# Patient Record
Sex: FEMALE | Race: WHITE | NOT HISPANIC OR LATINO
[De-identification: names, ages, dates, MRNs, and addresses within clinical notes are randomized per-mention and may not be internally consistent; named-entity substitution may affect disease eponyms.]

---

## 2019-01-10 ENCOUNTER — APPOINTMENT (OUTPATIENT)
Dept: ORTHOPEDIC SURGERY | Facility: CLINIC | Age: 59
End: 2019-01-10

## 2022-05-03 ENCOUNTER — NON-APPOINTMENT (OUTPATIENT)
Age: 62
End: 2022-05-03

## 2022-05-03 ENCOUNTER — APPOINTMENT (OUTPATIENT)
Dept: INTERNAL MEDICINE | Facility: CLINIC | Age: 62
End: 2022-05-03
Payer: MEDICARE

## 2022-05-03 ENCOUNTER — LABORATORY RESULT (OUTPATIENT)
Age: 62
End: 2022-05-03

## 2022-05-03 ENCOUNTER — TRANSCRIPTION ENCOUNTER (OUTPATIENT)
Age: 62
End: 2022-05-03

## 2022-05-03 VITALS
HEIGHT: 65 IN | BODY MASS INDEX: 22.99 KG/M2 | OXYGEN SATURATION: 97 % | DIASTOLIC BLOOD PRESSURE: 80 MMHG | TEMPERATURE: 98.4 F | WEIGHT: 138 LBS | SYSTOLIC BLOOD PRESSURE: 125 MMHG | HEART RATE: 81 BPM

## 2022-05-03 DIAGNOSIS — F02.80 ALZHEIMER'S DISEASE, UNSPECIFIED: ICD-10-CM

## 2022-05-03 DIAGNOSIS — F41.8 OTHER SPECIFIED ANXIETY DISORDERS: ICD-10-CM

## 2022-05-03 DIAGNOSIS — G30.9 ALZHEIMER'S DISEASE, UNSPECIFIED: ICD-10-CM

## 2022-05-03 DIAGNOSIS — Z00.00 ENCOUNTER FOR GENERAL ADULT MEDICAL EXAMINATION W/OUT ABNORMAL FINDINGS: ICD-10-CM

## 2022-05-03 PROCEDURE — 99386 PREV VISIT NEW AGE 40-64: CPT | Mod: 25

## 2022-05-03 PROCEDURE — 93000 ELECTROCARDIOGRAM COMPLETE: CPT | Mod: 59

## 2022-05-03 PROCEDURE — 36415 COLL VENOUS BLD VENIPUNCTURE: CPT

## 2022-05-03 PROCEDURE — G0444 DEPRESSION SCREEN ANNUAL: CPT | Mod: NC,59

## 2022-05-03 RX ORDER — CHLORHEXIDINE GLUCONATE 4 %
1000 LIQUID (ML) TOPICAL
Refills: 0 | Status: ACTIVE | COMMUNITY

## 2022-05-03 RX ORDER — DONEPEZIL HYDROCHLORIDE 10 MG/1
10 TABLET, FILM COATED ORAL
Refills: 0 | Status: ACTIVE | COMMUNITY

## 2022-05-03 RX ORDER — BUPROPION HYDROCHLORIDE 300 MG/1
300 TABLET, EXTENDED RELEASE ORAL
Refills: 0 | Status: ACTIVE | COMMUNITY

## 2022-05-03 RX ORDER — MEMANTINE HYDROCHLORIDE 10 MG/1
10 TABLET, FILM COATED ORAL
Refills: 0 | Status: ACTIVE | COMMUNITY

## 2022-05-03 NOTE — HEALTH RISK ASSESSMENT
[0] : 2) Feeling down, depressed, or hopeless: Not at all (0) [PHQ-2 Negative - No further assessment needed] : PHQ-2 Negative - No further assessment needed [Patient reported mammogram was normal] : Patient reported mammogram was normal [Patient reported PAP Smear was normal] : Patient reported PAP Smear was normal [# Of Children ___] : has [unfilled] children [[id number]] : 0 [Reports changes in hearing] : Reports no changes in hearing [Reports changes in vision] : Reports no changes in vision [MammogramDate] : 01/19 [PapSmearDate] : 11/15

## 2022-05-03 NOTE — PLAN
[FreeTextEntry1] : Reviewed age appropriate preventive screening, discussed importance of healthy eating and routine physical activity.\par -Bouts of anxiety, skin picking-increase buspar to 30mg BID\par

## 2022-05-03 NOTE — PHYSICAL EXAM
[Normal] : normal rate, regular rhythm, normal S1 and S2 and no murmur heard [No Edema] : there was no peripheral edema [Soft] : abdomen soft [Non Tender] : non-tender [Non-distended] : non-distended [de-identified] : Areas of scalps along fingers/forehead

## 2022-05-03 NOTE — HISTORY OF PRESENT ILLNESS
[FreeTextEntry1] : NP CPE [de-identified] : 62 y/o f. hx of Alzheimer's dementia, depression w/ anxiety here for CPE\par Last mammogram two years ago-normal\par No prior colonoscopy\par No regular exercise\par Pending appt w/ neurology

## 2022-05-06 LAB
25(OH)D3 SERPL-MCNC: 40.5 NG/ML
BASOPHILS # BLD AUTO: 0.03 K/UL
BASOPHILS NFR BLD AUTO: 0.6 %
EOSINOPHIL # BLD AUTO: 0.09 K/UL
EOSINOPHIL NFR BLD AUTO: 1.8 %
ESTIMATED AVERAGE GLUCOSE: 114 MG/DL
HBA1C MFR BLD HPLC: 5.6 %
HCT VFR BLD CALC: 44.4 %
HGB BLD-MCNC: 14.3 G/DL
IMM GRANULOCYTES NFR BLD AUTO: 0.2 %
LYMPHOCYTES # BLD AUTO: 1.54 K/UL
LYMPHOCYTES NFR BLD AUTO: 30.1 %
MAN DIFF?: NORMAL
MCHC RBC-ENTMCNC: 31.9 PG
MCHC RBC-ENTMCNC: 32.2 GM/DL
MCV RBC AUTO: 99.1 FL
MONOCYTES # BLD AUTO: 0.38 K/UL
MONOCYTES NFR BLD AUTO: 7.4 %
NEUTROPHILS # BLD AUTO: 3.06 K/UL
NEUTROPHILS NFR BLD AUTO: 59.9 %
PLATELET # BLD AUTO: 365 K/UL
RBC # BLD: 4.48 M/UL
RBC # FLD: 13.6 %
WBC # FLD AUTO: 5.11 K/UL

## 2022-05-09 ENCOUNTER — NON-APPOINTMENT (OUTPATIENT)
Age: 62
End: 2022-05-09

## 2022-05-09 LAB
ALBUMIN SERPL ELPH-MCNC: 4.2 G/DL
ALP BLD-CCNC: 128 U/L
ALT SERPL-CCNC: 22 U/L
ANION GAP SERPL CALC-SCNC: 12 MMOL/L
APPEARANCE: CLEAR
AST SERPL-CCNC: 18 U/L
BILIRUB SERPL-MCNC: <0.2 MG/DL
BILIRUBIN URINE: NEGATIVE
BLOOD URINE: NEGATIVE
BUN SERPL-MCNC: 15 MG/DL
CALCIUM SERPL-MCNC: 9 MG/DL
CHLORIDE SERPL-SCNC: 106 MMOL/L
CHOLEST SERPL-MCNC: 257 MG/DL
CO2 SERPL-SCNC: 26 MMOL/L
COLOR: NORMAL
CREAT SERPL-MCNC: 1.23 MG/DL
EGFR: 50 ML/MIN/1.73M2
GLUCOSE QUALITATIVE U: NEGATIVE
GLUCOSE SERPL-MCNC: 98 MG/DL
HDLC SERPL-MCNC: 55 MG/DL
KETONES URINE: NEGATIVE
LDLC SERPL CALC-MCNC: NORMAL MG/DL
LEUKOCYTE ESTERASE URINE: NEGATIVE
NITRITE URINE: NEGATIVE
NONHDLC SERPL-MCNC: 202 MG/DL
PH URINE: 6
POTASSIUM SERPL-SCNC: 4 MMOL/L
PROT SERPL-MCNC: 6.5 G/DL
PROTEIN URINE: NORMAL
SODIUM SERPL-SCNC: 143 MMOL/L
SPECIFIC GRAVITY URINE: 1.02
TRIGL SERPL-MCNC: 466 MG/DL
TSH SERPL-ACNC: 4.26 UIU/ML
UROBILINOGEN URINE: NORMAL
VIT B12 SERPL-MCNC: 1417 PG/ML

## 2022-07-20 ENCOUNTER — APPOINTMENT (OUTPATIENT)
Dept: NEUROLOGY | Facility: CLINIC | Age: 62
End: 2022-07-20

## 2022-07-28 ENCOUNTER — RX RENEWAL (OUTPATIENT)
Age: 62
End: 2022-07-28

## 2022-08-08 ENCOUNTER — TRANSCRIPTION ENCOUNTER (OUTPATIENT)
Age: 62
End: 2022-08-08

## 2022-11-21 ENCOUNTER — RX RENEWAL (OUTPATIENT)
Age: 62
End: 2022-11-21

## 2022-11-21 RX ORDER — BUSPIRONE HYDROCHLORIDE 30 MG/1
30 TABLET ORAL
Qty: 60 | Refills: 0 | Status: ACTIVE | COMMUNITY
Start: 2022-07-28 | End: 1900-01-01

## 2023-01-23 ENCOUNTER — RX RENEWAL (OUTPATIENT)
Age: 63
End: 2023-01-23

## 2023-03-11 ENCOUNTER — EMERGENCY (EMERGENCY)
Facility: HOSPITAL | Age: 63
LOS: 1 days | Discharge: HOME | End: 2023-03-11
Attending: EMERGENCY MEDICINE | Admitting: EMERGENCY MEDICINE
Payer: MEDICARE

## 2023-03-11 VITALS
SYSTOLIC BLOOD PRESSURE: 131 MMHG | OXYGEN SATURATION: 99 % | RESPIRATION RATE: 16 BRPM | DIASTOLIC BLOOD PRESSURE: 81 MMHG | HEART RATE: 84 BPM | TEMPERATURE: 98 F

## 2023-03-11 VITALS
TEMPERATURE: 98 F | HEIGHT: 64 IN | SYSTOLIC BLOOD PRESSURE: 134 MMHG | OXYGEN SATURATION: 98 % | RESPIRATION RATE: 16 BRPM | HEART RATE: 83 BPM | WEIGHT: 136.69 LBS | DIASTOLIC BLOOD PRESSURE: 82 MMHG

## 2023-03-11 PROCEDURE — 99284 EMERGENCY DEPT VISIT MOD MDM: CPT

## 2023-03-11 PROCEDURE — 70450 CT HEAD/BRAIN W/O DYE: CPT | Mod: 26,MA

## 2023-03-11 PROCEDURE — 70450 CT HEAD/BRAIN W/O DYE: CPT | Mod: MA

## 2023-03-11 PROCEDURE — 99284 EMERGENCY DEPT VISIT MOD MDM: CPT | Mod: 25

## 2023-03-11 NOTE — ED PROVIDER NOTE - NSFOLLOWUPINSTRUCTIONS_ED_ALL_ED_FT
Head Injury, Adult    There are many types of head injuries. Head injuries can be as minor as a small bump, or they can be a serious medical issue. More severe head injuries include:  •A jarring injury to the brain (concussion).    •A bruise (contusion) of the brain. This means there is bleeding in the brain that can cause swelling.    •A cracked skull (skull fracture).    •Bleeding in the brain that collects, clots, and forms a bump (hematoma).    After a head injury, most problems occur within the first 24 hours, but side effects may occur up to 7–10 days after the injury. It is important to watch your condition for any changes. You may need to be observed in the emergency department or urgent care, or you may be admitted to the hospital.    What are the causes?    There are many possible causes of a head injury. Serious head injuries may be caused by car accidents, bicycle or motorcycle accidents, sports injuries, falls, or being struck by an object.    What are the symptoms?    Symptoms of a head injury include a contusion, bump, or bleeding at the site of the injury. Other physical symptoms may include:  •Headache.    •Nausea or vomiting.    •Dizziness.    •Blurred or double vision.    •Being uncomfortable around bright lights or loud noises.    •Seizures.    •Feeling tired.    •Trouble being awakened.    •Loss of consciousness.    Mental or emotional symptoms may include:  •Irritability.    •Confusion and memory problems.    •Poor attention and concentration.    •Changes in eating or sleeping habits.    •Anxiety or depression.    How is this diagnosed?    This condition can usually be diagnosed based on your symptoms, a description of the injury, and a physical exam. You may also have imaging tests done, such as a CT scan or an MRI.    How is this treated?    Treatment for this condition depends on the severity and type of injury you have. The main goal of treatment is to prevent complications and allow the brain time to heal.    Mild head injury     If you have a mild head injury, you may be sent home, and treatment may include:  •Observation. A responsible adult should stay with you for 24 hours after your injury and check on you often.    •Physical rest.    •Brain rest.    •Pain medicines.    Severe head injury    If you have a severe head injury, treatment may include:•Close observation. This includes hospitalization with the following care:  •Frequent physical exams.    •Frequent checks of how your brain and nervous system are working (neurological status).    •Checking your blood pressure and oxygen levels.    •Medicines to relieve pain, prevent seizures, and decrease brain swelling.    •Airway protection and breathing support. This may include using a ventilator.    •Treatments that monitor and manage swelling inside the brain.    •Brain surgery. This may be needed to:  •Remove a collection of blood or blood clots.    •Stop the bleeding.    •Remove a part of the skull to allow room for the brain to swell.    Follow these instructions at home:    Activity     •Rest and avoid activities that are physically hard or tiring.    •Make sure you get enough sleep.    •Let your brain rest by limiting activities that require a lot of thought or attention, such as:  •Watching TV.    •Playing memory games and puzzles.    •Job-related work or homework.    •Working on the computer, using social media, and texting.    •Avoid activities that could cause another head injury, such as playing sports, until your health care provider approves. Having another head injury, especially before the first one has healed, can be dangerous.    •Ask your health care provider when it is safe for you to return to your regular activities, including work or school. Ask your health care provider for a step-by-step plan for gradually returning to activities.    •Ask your health care provider when you can drive, ride a bicycle, or use heavy machinery. Your ability to react may be slower after a brain injury. Do not do these activities if you are dizzy.    Lifestyle      • Do not drink alcohol until your health care provider approves. Do not use drugs. Alcohol and certain drugs may slow your recovery and can put you at risk of further injury.    •If it is harder than usual to remember things, write them down.    •If you are easily distracted, try to do one thing at a time.    •Talk with family members or close friends when making important decisions.    •Tell your friends, family, a trusted colleague, and  about your injury, symptoms, and restrictions. Have them watch for any new or worsening problems.    General instructions     •Take over-the-counter and prescription medicines only as told by your health care provider.    •Have someone stay with you for 24 hours after your head injury. This person should watch you for any changes in your symptoms and be ready to seek medical help.    •Keep all follow-up visits as told by your health care provider. This is important.    How is this prevented?    •Work on improving your balance and strength to avoid falls.    •Wear a seat belt when you are in a moving vehicle.    •Wear a helmet when riding a bicycle, skiing, or doing any other sport or activity that has a risk of injury.    •If you drink alcohol:•Limit how much you use to:  •0–1 drink a day for nonpregnant women.    •0–2 drinks a day for men.    •Be aware of how much alcohol is in your drink. In the [place] one drink equals one 12 oz bottle of beer (355 mL), one 5 oz glass of wine (148 mL), or one 1½ oz glass of hard liquor (44 mL).    •Take safety measures in your home, such as:  •Removing clutter and tripping hazards from floors and stairways.    •Using grab bars in bathrooms and handrails by stairs.    •Placing non-slip mats on floors and in bathtubs.    •Improving lighting in dim areas.    Where to find more information    •Centers for Disease Control and Prevention: www.cdc.gov    Get help right away if:  •You have:  •A severe headache that is not helped by medicine.    •Trouble walking or weakness in your arms and legs.    •Clear or bloody fluid coming from your nose or ears.    •Changes in your vision.    •A seizure.    •Increased confusion or irritability.    •Your symptoms get worse.    •You are sleepier than normal and have trouble staying awake.    •You lose your balance.    •Your pupils change size.    •Your speech is slurred.    •Your dizziness gets worse.    •You vomit.    These symptoms may represent a serious problem that is an emergency. Do not wait to see if the symptoms will go away. Get medical help right away. Call your local emergency services (911 in the [place] Do not drive yourself to the hospital.     Summary    •Head injuries can be minor, or they can be a serious medical issue requiring immediate attention.    •Treatment for this condition depends on the severity and type of injury you have.    •Have someone stay with you for 24 hours after your injury and check on you often.    •Ask your health care provider when it is safe for you to return to your regular activities, including work or school.    •Head injury prevention includes wearing a seat belt in a motor vehicle, using a helmet on a bicycle, limiting alcohol use, and taking safety measures in your home.    This information is not intended to replace advice given to you by your health care provider. Make sure you discuss any questions you have with your health care provider.

## 2023-03-11 NOTE — ED PROVIDER NOTE - CARE PROVIDER_API CALL
NICOLE MATTHEWS  Cardiology  [address] [facility] [address]  [place] [address]  Phone: [phone]  Fax: [phone]  Follow Up Time: 1-3 Days   NICOLE MATTHEWS  Cardiology  [address] [facility] [address]  [place] [address]  Phone: [phone]  Fax: [phone]  Follow Up Time: 1-3 Days   NICOLE MATTHEWS  Cardiology  [address] [facility] [address]  [place] [address]  Phone: [phone]  Fax: [phone]  Follow Up Time: 1-3 Days

## 2023-03-11 NOTE — ED PROVIDER NOTE - OBJECTIVE STATEMENT
Patient brought in by family member for CAT scan of head status post head injury.  Patient poor historian secondary to dementia unsure how she hit her head but denies any headache dizziness nausea vomiting weakness numbness or any other complaints.  Per family, they were out to lunch when patient accidentally walked into a clear glass door bumping her head.  Injury occurred approximately 1:00.  Patient has been acting as her usual self since, and has not had any complaints however when they returned to the patient's assisted living they did not want to take the patient back until the had a CAT scan of the head.  MARGARETTE Steele

## 2023-03-11 NOTE — ED ADULT TRIAGE NOTE - CHIEF COMPLAINT QUOTE
" We were out for lunch - she accidently banged her forehead on to a very clean glass door " No LOC No pain No blood thinners

## 2023-03-11 NOTE — ED ADULT TRIAGE NOTE - GLASGOW COMA SCALE: BEST MOTOR RESPONSE, MLM
(M6) obeys commands [FreeTextEntry1] : 51 year old female  with a past medical history of hypothyroidism who presents for management of her thyroid disease\par \par 1. Weight gain\par Suspect patient is experiencing all her symptoms from menopause\par Her labs are consistent with menopause\par Patient stopped Phentermine\par She has been able to lose 8 lbs \par She is asking for another agent to helo with weight loss\par I suggested Bashir \par I also encouraged that overall her weight is healthy and she follows a very healthy lifestyle\par \par 2. Hypothyroidism\par TFTs are appropriate range\par Cont Levothyroxine to 100 mcg 6.5 tabs per week\par \par Follow up in 6 months

## 2023-03-11 NOTE — ED ADULT NURSE NOTE - NSIMPLEMENTINTERV_GEN_ALL_ED
Implemented All Universal Safety Interventions:  [place] to call system. Call bell, personal items and telephone within reach. Instruct patient to call for assistance. Room bathroom lighting operational. Non-slip footwear when patient is off stretcher. Physically safe environment: no spills, clutter or unnecessary equipment. Stretcher in lowest position, wheels locked, appropriate side rails in place. Implemented All Universal Safety Interventions:  [place] to call system. Call bell, personal items and telephone within reach. Instruct patient to call for assistance. Room bathroom lighting operational. Non-slip footwear when patient is off stretcher. Physically safe environment: no spills, clutter or unnecessary equipment. Stretcher in lowest position, wheels locked, appropriate side rails in place. Implemented All Universal Safety Interventions:  [place] to call system. Call bell, personal items and telephone within reach. Instruct patient to call for assistance. Room bathroom lighting operational. Non-slip footwear when patient is off stretcher. Physically safe environment: no spills, clutter or unnecessary equipment. Stretcher in lowest position, wheels locked, appropriate side rails in place.

## 2023-03-11 NOTE — ED PROVIDER NOTE - PATIENT PORTAL LINK FT
You can access the FollowMyHealth Patient Portal offered by [facility] by registering at the following website: [email or web address]. By joining [employer]’s FollowMyHealth portal, you will also be able to view your health information using other applications (apps) compatible with our system. You can access the FollowMyHealth Patient Portal offered by [facility] by registering at the following website: [email or web address]. By joining [employer] [facility]’s FollowMyHealth portal, you will also be able to view your health information using other applications (apps) compatible with our system. You can access the FollowMyHealth Patient Portal offered by [facility] by registering at the following website: [email or web address]. By joining [employer]’s FollowMyHealth portal, you will also be able to view your health information using other applications (apps) compatible with our system.

## 2023-03-11 NOTE — ED ADULT NURSE NOTE - OBJECTIVE STATEMENT
pt brought in by family s/p walking into glass door hitting head, pt went back to AL facility and they requested pt come to the ED to be checked out, pt is poor historian secondary to dementia. as per family member is at her baseline mental status. pt denies n/v, any headache dizziness, blurry vision, weakness numbness or any other complaint. neuro intact, no acute focal deficits.

## 2023-03-11 NOTE — ED PROVIDER NOTE - CLINICAL SUMMARY MEDICAL DECISION MAKING FREE TEXT BOX
Patient brought in by family member for CAT scan of head status post head injury.  Patient poor historian secondary to dementia unsure how she hit her head but denies any headache dizziness nausea vomiting weakness numbness or any other complaints.  Per family they were out to lunch when patient accidentally walked into a "clear glass door bumping her head.  Injury occurred approximately 1:00 patient has been acting as her usual self since has not had any complaints however when they returned to the patient's assisted living they did not want to take the patient back until the had a CAT scan of the head.  PMD Rattiner    Plan CT head  Differential including but not limited to intracranial hemorrhage skull fracture

## 2023-03-11 NOTE — ED PROVIDER NOTE - DIFFERENTIAL DIAGNOSIS
Differential including but not limited to intracranial hemorrhage skull fracture Differential Diagnosis

## 2023-03-11 NOTE — ED PROVIDER NOTE - PROGRESS NOTE DETAILS
Reevaluated patient at bedside with family.  Patient feeling well.  Discussed the results of CT and copy of reports given.   An opportunity to ask questions was given.  Discussed the importance of prompt, close medical follow-up.  Patient will return with any changes, concerns or persistent / worsening symptoms.  Understanding of all instructions verbalized.

## 2023-08-18 ENCOUNTER — EMERGENCY (EMERGENCY)
Facility: HOSPITAL | Age: 63
LOS: 1 days | Discharge: HOME | End: 2023-08-18
Attending: STUDENT IN AN ORGANIZED HEALTH CARE EDUCATION/TRAINING PROGRAM | Admitting: STUDENT IN AN ORGANIZED HEALTH CARE EDUCATION/TRAINING PROGRAM
Payer: MEDICARE

## 2023-08-18 VITALS
DIASTOLIC BLOOD PRESSURE: 82 MMHG | OXYGEN SATURATION: 98 % | HEIGHT: 64 IN | SYSTOLIC BLOOD PRESSURE: 132 MMHG | RESPIRATION RATE: 18 BRPM | TEMPERATURE: 98 F | HEART RATE: 88 BPM | WEIGHT: 130.07 LBS

## 2023-08-18 LAB
APPEARANCE UR: CLEAR — SIGNIFICANT CHANGE UP
BACTERIA # UR AUTO: NEGATIVE /HPF — SIGNIFICANT CHANGE UP
BILIRUB UR-MCNC: NEGATIVE — SIGNIFICANT CHANGE UP
COLOR SPEC: YELLOW — SIGNIFICANT CHANGE UP
DIFF PNL FLD: NEGATIVE — SIGNIFICANT CHANGE UP
EPI CELLS # UR: PRESENT
GLUCOSE UR QL: NEGATIVE MG/DL — SIGNIFICANT CHANGE UP
KETONES UR-MCNC: NEGATIVE MG/DL — SIGNIFICANT CHANGE UP
LEUKOCYTE ESTERASE UR-ACNC: ABNORMAL
NITRITE UR-MCNC: NEGATIVE — SIGNIFICANT CHANGE UP
PH UR: 5.5 — SIGNIFICANT CHANGE UP (ref 5–8)
PROT UR-MCNC: NEGATIVE MG/DL — SIGNIFICANT CHANGE UP
RBC CASTS # UR COMP ASSIST: SIGNIFICANT CHANGE UP /HPF
SP GR SPEC: 1.03 — SIGNIFICANT CHANGE UP (ref 1–1.03)
UROBILINOGEN FLD QL: 0.2 MG/DL — SIGNIFICANT CHANGE UP (ref 0.2–1)
WBC UR QL: ABNORMAL /HPF (ref 0–5)

## 2023-08-18 PROCEDURE — 99284 EMERGENCY DEPT VISIT MOD MDM: CPT

## 2023-08-18 PROCEDURE — 81001 URINALYSIS AUTO W/SCOPE: CPT

## 2023-08-18 PROCEDURE — 99283 EMERGENCY DEPT VISIT LOW MDM: CPT

## 2023-08-18 PROCEDURE — 87086 URINE CULTURE/COLONY COUNT: CPT

## 2023-08-18 RX ORDER — HYDROCORTISONE 1 %
1 OINTMENT (GRAM) TOPICAL
Qty: 1 | Refills: 0
Start: 2023-08-18 | End: 2023-08-24

## 2023-08-18 RX ORDER — CEFUROXIME AXETIL 250 MG
1 TABLET ORAL
Qty: 14 | Refills: 0
Start: 2023-08-18 | End: 2023-08-24

## 2023-08-18 RX ORDER — CEFUROXIME AXETIL 250 MG
500 TABLET ORAL ONCE
Refills: 0 | Status: COMPLETED | OUTPATIENT
Start: 2023-08-18 | End: 2023-08-18

## 2023-08-18 RX ADMIN — Medication 500 MILLIGRAM(S): at 22:25

## 2023-08-18 NOTE — ED ADULT NURSE NOTE - CHIEF COMPLAINT QUOTE
per ems from the [facility], pt started wandering today. per ems assisted living is requesting a psych eval and evaluation for uti. hx of dementia. pt denies SI/HI per ems from the [facility], pt started wandering today. per ems assisted living is requesting a psych eval and evaluation for uti. hx of dementia. pt denies SI/HI per ems from the [facility], pt started wandering today. per ems assisted living is requesting a psych eval and evaluation for uti. hx of dementia. pt denies SI/HI

## 2023-08-18 NOTE — ED ADULT NURSE NOTE - CADM POA URETHRAL CATHETER
Attempted to reach patient for: routine supportive call to patient    Outcome: no answer, left message for call back    Next Follow Up: Tuesday  
no dysuria, no frequency, and no hematuria.
No

## 2023-08-18 NOTE — ED ADULT NURSE NOTE - NSFALLUNIVINTERV_ED_ALL_ED
Bed/Stretcher in lowest position, wheels locked, appropriate side rails in place/Call bell, personal items and telephone in reach/Instruct patient to call for assistance before getting out of bed/chair/stretcher/Non-slip footwear applied when patient is off stretcher/[place] to call system/Physically safe environment - no spills, clutter or unnecessary equipment/Purposeful proactive rounding/Room/bathroom lighting operational, light cord in reach Bed/Stretcher in lowest position, wheels locked, appropriate side rails in place/Call bell, personal items and telephone in reach/Instruct patient to call for assistance before getting out of bed/chair/stretcher/Non-slip footwear applied when patient is off stretcher/[place] to call system/Physically safe environment - no spills, clutter or unnecessary equipment/Purposeful proactive rounding/Room/bathroom lighting operational, light cord in reach Bed/Stretcher in lowest position, wheels locked, appropriate side rails in place/Call bell, personal items and telephone in reach/Instruct patient to call for assistance before getting out of bed/chair/stretcher/Non-slip footwear applied when patient is off stretcher/[place] to call system/Physically safe environment - no spills, clutter or unnecessary equipment/Purposeful proactive rounding/Room/bathroom lighting operational, light cord in reach

## 2023-08-18 NOTE — ED ADULT NURSE NOTE - OBJECTIVE STATEMENT
patient is from the [facility], patient was found wandering today. per ems assisted living is requesting a psych eval and evaluation for uti. hx of dementia. pt denies SI/HI patient is from the [facility], patient was found wandering today. per ems assisted living is requesting a psych eval and evaluation for uti. hx of dementia. pt denies SI/HI patient is from the [facility], patient was found wandering today. per ems assisted living is requesting a psych eval and evaluation for uti. hx of dementia. pt denies SI/HI

## 2023-08-18 NOTE — ED PROVIDER NOTE - OBJECTIVE STATEMENT
"Last Written Prescription Date:  2/21/2018  Last Fill Quantity: 30,  # refills: 1   Last office visit: 12/11/2017 with prescribing provider:  Dr. Blanchard   Future Office Visit:   Next 5 appointments (look out 90 days)     May 03, 2018  8:20 AM CDT   [facility]    [address] [place] [id number] [phone]                 Requested Prescriptions   Pending Prescriptions Disp Refills     atorvastatin (LIPITOR) 40 MG tablet [Pharmacy Med Name: ATORVASTATIN 40MG TABLET] 30 tablet      Sig: TAKE 1 TABLET BY MOUTH DAILY AT BEDTIME    Statins Protocol Passed    4/17/2018  9:58 AM       Passed - LDL on file in past 12 months    Recent Labs   Lab Test  06/15/17   0852   LDL  64            Passed - No abnormal creatine kinase in past 12 months    Recent Labs   Lab Test  11/05/15   1750   CKT  334*               Passed - Recent (12 mo) or future (30 days) visit within the authorizing provider's specialty    Patient had office visit in the last 12 months or has a visit in the next 30 days with authorizing provider or within the authorizing provider's specialty.  See \"Patient Info\" tab in inbasket, or \"Choose Columns\" in Meds & Orders section of the refill encounter.           Passed - Patient is age 18 or older          " Patient is a 62-year-old female brought in by EMS from the [facility] with past medical history of dementia anxiety depression insomnia overactive bladder coming in for evaluation for UTI.  Patient was found wandering in symptoms to rule out infection.  Patient does admit to having increased urgency and frequency no dysuria no nausea no vomiting no fever no chills no flank pain.  Patient also complaining of some hemorrhoids which she wants evaluated.  Confirmed with facility patient is not here for psychiatric evaluation.  Denies any suicidal homicidal ideation.  Patient's sister bedside. Patient is a 62-year-old female brought in by EMS from the [facility] with past medical history of dementia anxiety depression insomnia overactive bladder coming in for evaluation for UTI.  Patient was found wandering in symptoms to rule out infection.  Patient does admit to having increased urgency and frequency no dysuria no nausea no vomiting no fever no chills no flank pain.  Patient also complaining of some hemorrhoids which she wants evaluated.  Confirmed with facility patient is not here for psychiatric evaluation.  Denies any suicidal homicidal ideation.  Patient's sister bedside. Patient is a 62-year-old female brought in by EMS from the [facility] with past medical history of dementia anxiety depression insomnia overactive bladder coming in for evaluation for UTI.  Patient was found wandering in symptoms to rule out infection.  Patient does admit to having increased urgency and frequency no dysuria no nausea no vomiting no fever no chills no flank pain.  Patient also complaining of some hemorrhoids which she wants evaluated.  Confirmed with facility patient is not here for psychiatric evaluation.  Denies any suicidal homicidal ideation.  Patient's sister bedside.

## 2023-08-18 NOTE — ED PROVIDER NOTE - NSFOLLOWUPINSTRUCTIONS_ED_ALL_ED_FT
Take antibiotics as directed   apply anusol 2-4 times daily or after bm  family will  AZO over the counter  return to er for any worsening symptoms     Urinary Tract Infection, Adult    A urinary tract infection (UTI) is an infection of any part of the urinary tract. The urinary tract includes the kidneys, ureters, bladder, and urethra. These organs make, store, and get rid of urine in the body.    An upper UTI affects the ureters and kidneys. A lower UTI affects the bladder and urethra.    What are the causes?  Most urinary tract infections are caused by bacteria in your genital area around your urethra, where urine leaves your body. These bacteria grow and cause inflammation of your urinary tract.    What increases the risk?  You are more likely to develop this condition if:  You have a urinary catheter that stays in place.  You are not able to control when you urinate or have a bowel movement (incontinence).  You are female and you:  Use a spermicide or diaphragm for birth control.  Have low estrogen levels.  Are pregnant.  You have certain genes that increase your risk.  You are sexually active.  You take antibiotic medicines.  You have a condition that causes your flow of urine to slow down, such as:  An enlarged prostate, if you are male.  Blockage in your urethra.  A kidney stone.  A nerve condition that affects your bladder control (neurogenic bladder).  Not getting enough to drink, or not urinating often.  You have certain medical conditions, such as:  Diabetes.  A weak disease-fighting system (immunesystem).  Sickle cell disease.  Gout.  Spinal cord injury.  What are the signs or symptoms?  Symptoms of this condition include:  Needing to urinate right away (urgency).  Frequent urination. This may include small amounts of urine each time you urinate.  Pain or burning with urination.  Blood in the urine.  Urine that smells bad or unusual.  Trouble urinating.  Cloudy urine.  Vaginal discharge, if you are female.  Pain in the abdomen or the lower back.  You may also have:  Vomiting or a decreased appetite.  Confusion.  Irritability or tiredness.  A fever or chills.  Diarrhea.  The first symptom in older adults may be confusion. In some cases, they may not have any symptoms until the infection has worsened.    How is this diagnosed?  This condition is diagnosed based on your medical history and a physical exam. You may also have other tests, including:  Urine tests.  Blood tests.  Tests for STIs (sexually transmitted infections).  If you have had more than one UTI, a cystoscopy or imaging studies may be done to determine the cause of the infections.    How is this treated?  Treatment for this condition includes:  Antibiotic medicine.  Over-the-counter medicines to treat discomfort.  Drinking enough water to stay hydrated.  If you have frequent infections or have other conditions such as a kidney stone, you may need to see a health care provider who specializes in the urinary tract (urologist).    In rare cases, urinary tract infections can cause sepsis. Sepsis is a life-threatening condition that occurs when the body responds to an infection. Sepsis is treated in the hospital with IV antibiotics, fluids, and other medicines.    Follow these instructions at home:    Medicines    Take over-the-counter and prescription medicines only as told by your health care provider.  If you were prescribed an antibiotic medicine, take it as told by your health care provider. Do not stop using the antibiotic even if you start to feel better.  General instructions    Make sure you:  Empty your bladder often and completely. Do not hold urine for long periods of time.  Empty your bladder after sex.  Wipe from front to back after urinating or having a bowel movement if you are female. Use each tissue only one time when you wipe.  Drink enough fluid to keep your urine pale yellow.  Keep all follow-up visits. This is important.  Contact a health care provider if:  Your symptoms do not get better after 1–2 days.  Your symptoms go away and then return.  Get help right away if:  You have severe pain in your back or your lower abdomen.  You have a fever or chills.  You have nausea or vomiting.  Summary  A urinary tract infection (UTI) is an infection of any part of the urinary tract, which includes the kidneys, ureters, bladder, and urethra.  Most urinary tract infections are caused by bacteria in your genital area.  Treatment for this condition often includes antibiotic medicines.  If you were prescribed an antibiotic medicine, take it as told by your health care provider. Do not stop using the antibiotic even if you start to feel better.  Keep all follow-up visits. This is important.  This information is not intended to replace advice given to you by your health care provider. Make sure you discuss any questions you have with your health care provider.

## 2023-08-18 NOTE — ED PROVIDER NOTE - CLINICAL SUMMARY MEDICAL DECISION MAKING FREE TEXT BOX
here for wandering behaviour, concern for UTI and hemorrhoid. patient with non-thromboised hemorrhoid on exam, over the counter cream recommended. Will check for UTI.

## 2023-08-18 NOTE — ED PROVIDER NOTE - PATIENT PORTAL LINK FT
You can access the FollowMyHealth Patient Portal offered by [facility] by registering at the following website: [email or web address]. By joining [employer] [facility]’s FollowMyHealth portal, you will also be able to view your health information using other applications (apps) compatible with our system. You can access the FollowMyHealth Patient Portal offered by [facility] by registering at the following website: [email or web address]. By joining [facility]’s FollowMyHealth portal, you will also be able to view your health information using other applications (apps) compatible with our system. You can access the FollowMyHealth Patient Portal offered by [facility] by registering at the following website: [email or web address]. By joining [facility]’s FollowMyHealth portal, you will also be able to view your health information using other applications (apps) compatible with our system.

## 2023-08-18 NOTE — ED ADULT TRIAGE NOTE - CHIEF COMPLAINT QUOTE
per ems from the [facility], pt started wandering today. per ems assisted living is requesting a psych eval and evaluation for uti. hx of dementia. pt denies SI/HI per ems from the [facility], pt started wandering today. per ems assisted living is requesting a psych eval and evaluation for uti. hx of dementia. pt denies SI/HI per ems from the [facility], pt started wandering today. per ems assisted living is requesting a psych eval and evaluation for uti. hx of dementia. pt denies SI/HI

## 2023-08-19 LAB
CULTURE RESULTS: SIGNIFICANT CHANGE UP
SPECIMEN SOURCE: SIGNIFICANT CHANGE UP

## 2023-12-27 ENCOUNTER — EMERGENCY (EMERGENCY)
Facility: HOSPITAL | Age: 63
LOS: 1 days | Discharge: HOME | End: 2023-12-27
Attending: EMERGENCY MEDICINE
Payer: MEDICARE

## 2023-12-27 VITALS
SYSTOLIC BLOOD PRESSURE: 118 MMHG | WEIGHT: 139.99 LBS | RESPIRATION RATE: 20 BRPM | HEIGHT: 63 IN | TEMPERATURE: 98 F | OXYGEN SATURATION: 98 % | DIASTOLIC BLOOD PRESSURE: 80 MMHG | HEART RATE: 90 BPM

## 2023-12-27 VITALS
RESPIRATION RATE: 16 BRPM | HEART RATE: 96 BPM | DIASTOLIC BLOOD PRESSURE: 80 MMHG | SYSTOLIC BLOOD PRESSURE: 117 MMHG | OXYGEN SATURATION: 97 % | TEMPERATURE: 99 F

## 2023-12-27 DIAGNOSIS — F03.918 UNSPECIFIED DEMENTIA, UNSPECIFIED SEVERITY, WITH OTHER BEHAVIORAL DISTURBANCE: ICD-10-CM

## 2023-12-27 PROBLEM — F03.90 UNSPECIFIED DEMENTIA WITHOUT BEHAVIORAL DISTURBANCE: Chronic | Status: ACTIVE | Noted: 2023-03-11

## 2023-12-27 PROBLEM — F03.90 UNSPECIFIED DEMENTIA, UNSPECIFIED SEVERITY, WITHOUT BEHAVIORAL DISTURBANCE, PSYCHOTIC DISTURBANCE, MOOD DISTURBANCE, AND ANXIETY: Chronic | Status: ACTIVE | Noted: 2023-03-11

## 2023-12-27 LAB
ALBUMIN SERPL ELPH-MCNC: 4.3 G/DL — SIGNIFICANT CHANGE UP (ref 3.3–5)
ALBUMIN SERPL ELPH-MCNC: 4.3 G/DL — SIGNIFICANT CHANGE UP (ref 3.3–5)
ALP SERPL-CCNC: 109 U/L — SIGNIFICANT CHANGE UP (ref 40–120)
ALP SERPL-CCNC: 109 U/L — SIGNIFICANT CHANGE UP (ref 40–120)
ALT FLD-CCNC: 25 U/L — SIGNIFICANT CHANGE UP (ref 10–45)
ALT FLD-CCNC: 25 U/L — SIGNIFICANT CHANGE UP (ref 10–45)
ANION GAP SERPL CALC-SCNC: 10 MMOL/L — SIGNIFICANT CHANGE UP (ref 5–17)
ANION GAP SERPL CALC-SCNC: 10 MMOL/L — SIGNIFICANT CHANGE UP (ref 5–17)
APAP SERPL-MCNC: <15 UG/ML — SIGNIFICANT CHANGE UP (ref 10–30)
APAP SERPL-MCNC: <15 UG/ML — SIGNIFICANT CHANGE UP (ref 10–30)
AST SERPL-CCNC: 19 U/L — SIGNIFICANT CHANGE UP (ref 10–40)
AST SERPL-CCNC: 19 U/L — SIGNIFICANT CHANGE UP (ref 10–40)
BASOPHILS # BLD AUTO: 0.02 K/UL — SIGNIFICANT CHANGE UP (ref 0–0.2)
BASOPHILS # BLD AUTO: 0.02 K/UL — SIGNIFICANT CHANGE UP (ref 0–0.2)
BASOPHILS NFR BLD AUTO: 0.3 % — SIGNIFICANT CHANGE UP (ref 0–2)
BASOPHILS NFR BLD AUTO: 0.3 % — SIGNIFICANT CHANGE UP (ref 0–2)
BILIRUB SERPL-MCNC: 0.4 MG/DL — SIGNIFICANT CHANGE UP (ref 0.2–1.2)
BILIRUB SERPL-MCNC: 0.4 MG/DL — SIGNIFICANT CHANGE UP (ref 0.2–1.2)
BUN SERPL-MCNC: 16 MG/DL — SIGNIFICANT CHANGE UP (ref 7–23)
BUN SERPL-MCNC: 16 MG/DL — SIGNIFICANT CHANGE UP (ref 7–23)
CALCIUM SERPL-MCNC: 9.7 MG/DL — SIGNIFICANT CHANGE UP (ref 8.4–10.5)
CALCIUM SERPL-MCNC: 9.7 MG/DL — SIGNIFICANT CHANGE UP (ref 8.4–10.5)
CHLORIDE SERPL-SCNC: 103 MMOL/L — SIGNIFICANT CHANGE UP (ref 96–108)
CHLORIDE SERPL-SCNC: 103 MMOL/L — SIGNIFICANT CHANGE UP (ref 96–108)
CO2 SERPL-SCNC: 26 MMOL/L — SIGNIFICANT CHANGE UP (ref 22–31)
CO2 SERPL-SCNC: 26 MMOL/L — SIGNIFICANT CHANGE UP (ref 22–31)
CREAT SERPL-MCNC: 0.9 MG/DL — SIGNIFICANT CHANGE UP (ref 0.5–1.3)
CREAT SERPL-MCNC: 0.9 MG/DL — SIGNIFICANT CHANGE UP (ref 0.5–1.3)
EGFR: 72 ML/MIN/1.73M2 — SIGNIFICANT CHANGE UP
EGFR: 72 ML/MIN/1.73M2 — SIGNIFICANT CHANGE UP
EOSINOPHIL # BLD AUTO: 0.09 K/UL — SIGNIFICANT CHANGE UP (ref 0–0.5)
EOSINOPHIL # BLD AUTO: 0.09 K/UL — SIGNIFICANT CHANGE UP (ref 0–0.5)
EOSINOPHIL NFR BLD AUTO: 1.4 % — SIGNIFICANT CHANGE UP (ref 0–6)
EOSINOPHIL NFR BLD AUTO: 1.4 % — SIGNIFICANT CHANGE UP (ref 0–6)
ETHANOL SERPL-MCNC: <10 MG/DL — SIGNIFICANT CHANGE UP (ref 0–10)
ETHANOL SERPL-MCNC: <10 MG/DL — SIGNIFICANT CHANGE UP (ref 0–10)
FLUAV AG NPH QL: SIGNIFICANT CHANGE UP
FLUAV AG NPH QL: SIGNIFICANT CHANGE UP
FLUBV AG NPH QL: SIGNIFICANT CHANGE UP
FLUBV AG NPH QL: SIGNIFICANT CHANGE UP
GLUCOSE SERPL-MCNC: 99 MG/DL — SIGNIFICANT CHANGE UP (ref 70–99)
GLUCOSE SERPL-MCNC: 99 MG/DL — SIGNIFICANT CHANGE UP (ref 70–99)
HCT VFR BLD CALC: 44.6 % — SIGNIFICANT CHANGE UP (ref 34.5–45)
HCT VFR BLD CALC: 44.6 % — SIGNIFICANT CHANGE UP (ref 34.5–45)
HGB BLD-MCNC: 14.5 G/DL — SIGNIFICANT CHANGE UP (ref 11.5–15.5)
HGB BLD-MCNC: 14.5 G/DL — SIGNIFICANT CHANGE UP (ref 11.5–15.5)
IMM GRANULOCYTES NFR BLD AUTO: 0.2 % — SIGNIFICANT CHANGE UP (ref 0–0.9)
IMM GRANULOCYTES NFR BLD AUTO: 0.2 % — SIGNIFICANT CHANGE UP (ref 0–0.9)
LYMPHOCYTES # BLD AUTO: 1.65 K/UL — SIGNIFICANT CHANGE UP (ref 1–3.3)
LYMPHOCYTES # BLD AUTO: 1.65 K/UL — SIGNIFICANT CHANGE UP (ref 1–3.3)
LYMPHOCYTES # BLD AUTO: 26.4 % — SIGNIFICANT CHANGE UP (ref 13–44)
LYMPHOCYTES # BLD AUTO: 26.4 % — SIGNIFICANT CHANGE UP (ref 13–44)
MCHC RBC-ENTMCNC: 31.9 PG — SIGNIFICANT CHANGE UP (ref 27–34)
MCHC RBC-ENTMCNC: 31.9 PG — SIGNIFICANT CHANGE UP (ref 27–34)
MCHC RBC-ENTMCNC: 32.5 GM/DL — SIGNIFICANT CHANGE UP (ref 32–36)
MCHC RBC-ENTMCNC: 32.5 GM/DL — SIGNIFICANT CHANGE UP (ref 32–36)
MCV RBC AUTO: 98.2 FL — SIGNIFICANT CHANGE UP (ref 80–100)
MCV RBC AUTO: 98.2 FL — SIGNIFICANT CHANGE UP (ref 80–100)
MONOCYTES # BLD AUTO: 0.4 K/UL — SIGNIFICANT CHANGE UP (ref 0–0.9)
MONOCYTES # BLD AUTO: 0.4 K/UL — SIGNIFICANT CHANGE UP (ref 0–0.9)
MONOCYTES NFR BLD AUTO: 6.4 % — SIGNIFICANT CHANGE UP (ref 2–14)
MONOCYTES NFR BLD AUTO: 6.4 % — SIGNIFICANT CHANGE UP (ref 2–14)
NEUTROPHILS # BLD AUTO: 4.07 K/UL — SIGNIFICANT CHANGE UP (ref 1.8–7.4)
NEUTROPHILS # BLD AUTO: 4.07 K/UL — SIGNIFICANT CHANGE UP (ref 1.8–7.4)
NEUTROPHILS NFR BLD AUTO: 65.3 % — SIGNIFICANT CHANGE UP (ref 43–77)
NEUTROPHILS NFR BLD AUTO: 65.3 % — SIGNIFICANT CHANGE UP (ref 43–77)
NRBC # BLD: 0 /100 WBCS — SIGNIFICANT CHANGE UP (ref 0–0)
NRBC # BLD: 0 /100 WBCS — SIGNIFICANT CHANGE UP (ref 0–0)
PLATELET # BLD AUTO: 308 K/UL — SIGNIFICANT CHANGE UP (ref 150–400)
PLATELET # BLD AUTO: 308 K/UL — SIGNIFICANT CHANGE UP (ref 150–400)
POTASSIUM SERPL-MCNC: 3.6 MMOL/L — SIGNIFICANT CHANGE UP (ref 3.5–5.3)
POTASSIUM SERPL-MCNC: 3.6 MMOL/L — SIGNIFICANT CHANGE UP (ref 3.5–5.3)
POTASSIUM SERPL-SCNC: 3.6 MMOL/L — SIGNIFICANT CHANGE UP (ref 3.5–5.3)
POTASSIUM SERPL-SCNC: 3.6 MMOL/L — SIGNIFICANT CHANGE UP (ref 3.5–5.3)
PROT SERPL-MCNC: 7.3 G/DL — SIGNIFICANT CHANGE UP (ref 6–8.3)
PROT SERPL-MCNC: 7.3 G/DL — SIGNIFICANT CHANGE UP (ref 6–8.3)
RBC # BLD: 4.54 M/UL — SIGNIFICANT CHANGE UP (ref 3.8–5.2)
RBC # BLD: 4.54 M/UL — SIGNIFICANT CHANGE UP (ref 3.8–5.2)
RBC # FLD: 13 % — SIGNIFICANT CHANGE UP (ref 10.3–14.5)
RBC # FLD: 13 % — SIGNIFICANT CHANGE UP (ref 10.3–14.5)
RSV RNA NPH QL NAA+NON-PROBE: SIGNIFICANT CHANGE UP
RSV RNA NPH QL NAA+NON-PROBE: SIGNIFICANT CHANGE UP
SALICYLATES SERPL-MCNC: <2 MG/DL — LOW (ref 15–30)
SALICYLATES SERPL-MCNC: <2 MG/DL — LOW (ref 15–30)
SARS-COV-2 RNA SPEC QL NAA+PROBE: SIGNIFICANT CHANGE UP
SARS-COV-2 RNA SPEC QL NAA+PROBE: SIGNIFICANT CHANGE UP
SODIUM SERPL-SCNC: 139 MMOL/L — SIGNIFICANT CHANGE UP (ref 135–145)
SODIUM SERPL-SCNC: 139 MMOL/L — SIGNIFICANT CHANGE UP (ref 135–145)
WBC # BLD: 6.24 K/UL — SIGNIFICANT CHANGE UP (ref 3.8–10.5)
WBC # BLD: 6.24 K/UL — SIGNIFICANT CHANGE UP (ref 3.8–10.5)
WBC # FLD AUTO: 6.24 K/UL — SIGNIFICANT CHANGE UP (ref 3.8–10.5)
WBC # FLD AUTO: 6.24 K/UL — SIGNIFICANT CHANGE UP (ref 3.8–10.5)

## 2023-12-27 PROCEDURE — 85025 COMPLETE CBC W/AUTO DIFF WBC: CPT

## 2023-12-27 PROCEDURE — 36415 COLL VENOUS BLD VENIPUNCTURE: CPT

## 2023-12-27 PROCEDURE — 99285 EMERGENCY DEPT VISIT HI MDM: CPT

## 2023-12-27 PROCEDURE — 90792 PSYCH DIAG EVAL W/MED SRVCS: CPT

## 2023-12-27 PROCEDURE — 99285 EMERGENCY DEPT VISIT HI MDM: CPT | Mod: 25

## 2023-12-27 PROCEDURE — 71045 X-RAY EXAM CHEST 1 VIEW: CPT

## 2023-12-27 PROCEDURE — 80307 DRUG TEST PRSMV CHEM ANLYZR: CPT

## 2023-12-27 PROCEDURE — 71045 X-RAY EXAM CHEST 1 VIEW: CPT | Mod: 26

## 2023-12-27 PROCEDURE — 80053 COMPREHEN METABOLIC PANEL: CPT

## 2023-12-27 PROCEDURE — 87637 SARSCOV2&INF A&B&RSV AMP PRB: CPT

## 2023-12-27 PROCEDURE — 93005 ELECTROCARDIOGRAM TRACING: CPT

## 2023-12-27 RX ADMIN — Medication 1 MILLIGRAM(S): at 13:41

## 2023-12-27 NOTE — ED ADULT TRIAGE NOTE - BP NONINVASIVE SYSTOLIC (MM HG)
Patient presented after waiting 30 minutes with no reaction to allergy injections. Discharged from clinic.  Lori Nguyen RN on 2/19/2018 at 10:52 AM    
118

## 2023-12-27 NOTE — ED ADULT NURSE REASSESSMENT NOTE - NS ED NURSE REASSESS COMMENT FT1
1249 Pt family here to see pt. Pt declining speak to her sisters at this  time Safety maintained  [place] 1241 Pt family here to see pt. Pt declining speak to her sisters at this  time Safety maintained  [place]

## 2023-12-27 NOTE — ED ADULT NURSE NOTE - OBJECTIVE STATEMENT
62 yo F with a PMH of dementia and depression, from [facility] presents for suicidal thoughts. Today at assisted living pt told staff that she "wanted to die" however has no plan. Was seen punching herself in her head (which she states she has done for years when she is upset). Told providers she hates her sisters for "putting her there (facility) and wants to live alone but they wont let her." States she has not been sleeping at night but has been eating/drinking normally Pt  alert x2 at times Pt aware f her dx if Alzheimer's  remained calm on arrival placed in the [facility] on stretcher at the time of arrival. Safety maintained Lelia 62 yo F with a PMH of dementia and depression, from [facility] presents for suicidal thoughts. Today at assisted living pt told staff that she "wanted to die" however has no plan. Was seen punching herself in her head (which she states she has done for years when she is upset). Told providers she hates her sisters for "putting her there (facility) and wants to live alone but they wont let her." States she has not been sleeping at night but has been eating/drinking normally Pt  alert x2 at times Pt aware f her dx if Alzheimer's  remained calm on arrival placed in the [facility] on stretcher at the time of arrival. Safety maintained Leila

## 2023-12-27 NOTE — ED BEHAVIORAL HEALTH ASSESSMENT NOTE - SUMMARY
62 y/o female, , two adult children (son and daughter), domiciled in assisted living facility ([facility]), sister acting as healthcare proxy. PPHx of depression, anxiety, and prior hx of SA/SI/NSSIB, with no known hx of psychiatric hospitalizations or hx of significant withdrawals/substance use. PMHx of dementia. Actively admitted for suicidal ideation and depression. Psychiatry consulted for suicidal ideation and depression.     Pt limited historian 2/2 to dementia. Endorses depression d/t being forced to live at an assisted living facility against her will. Per worker at assisted living facility, pt was sent to the hospital for evaluation after she was found beating herself in the head and saying she wanted to die. Per pt's family, the episode is 2/2 worsening dementia. Report hx of suicide attempt as a teenager, but express no concerns for her safety at this time 62 y/o female, , two adult children (son and daughter), domiciled in assisted living facility ([facility]), sister acting as healthcare proxy. PPHx of depression, anxiety, and prior hx of SA/SI/NSSIB, with no known hx of psychiatric hospitalizations or hx of significant withdrawals/substance use. PMHx of dementia. Actively admitted for suicidal ideation and depression. Psychiatry consulted for suicidal ideation and depression.     Pt limited historian 2/2 to dementia. Endorses depression d/t being forced to live at an assisted living facility against her will. Per worker at assisted living facility, pt was sent to the hospital for evaluation after she was found beating herself in the head and saying she wanted to die. Per pt's family, the episode is 2/2 worsening dementia. Report hx of suicide attempt as a teenager, but express no concerns for her safety at this time 64 y/o female, , two adult children (son and daughter), domiciled in assisted living facility ([facility]), sister acting as healthcare proxy. PPHx of depression, anxiety, and prior hx of SA/SI/NSSIB, with no known hx of psychiatric hospitalizations or hx of significant withdrawals/substance use. PMHx of dementia. In ER to be evaluated for suicidal ideation and depression. Psychiatry consulted for suicidal ideation and depression. Pt is a limited historian 2/2 to dementia. Reports having a "meltdown" which lead her to the hospital, but is unable to remember exactly what happened. States nothing in particular triggered the episode to her knowledge, but endorses depression d/t her current living situation. Reports her sisters sent her to live at the assisted living facility against her wishes. Expresses resentment towards both her sister for this reason. Denies mistreatment or feeling unsafe in her current living situation. States she has formed good friendships there. Denies active or passive SIIP/HIIP.  Family deny concerns for pt being a risk of harm to self/others or having access to lethal means. 62 y/o female, , two adult children (son and daughter), domiciled in assisted living facility ([facility]), sister acting as healthcare proxy. PPHx of depression, anxiety, and prior hx of SA/SI/NSSIB, with no known hx of psychiatric hospitalizations or hx of significant withdrawals/substance use. PMHx of dementia. In ER to be evaluated for suicidal ideation and depression. Psychiatry consulted for suicidal ideation and depression. Pt is a limited historian 2/2 to dementia. Reports having a "meltdown" which lead her to the hospital, but is unable to remember exactly what happened. States nothing in particular triggered the episode to her knowledge, but endorses depression d/t her current living situation. Reports her sisters sent her to live at the assisted living facility against her wishes. Expresses resentment towards both her sister for this reason. Denies mistreatment or feeling unsafe in her current living situation. States she has formed good friendships there. Denies active or passive SIIP/HIIP.  Family deny concerns for pt being a risk of harm to self/others or having access to lethal means.

## 2023-12-27 NOTE — ED PROVIDER NOTE - NSFOLLOWUPINSTRUCTIONS_ED_ALL_ED_FT
Please make sure to follow up with your primary care doctor within 1-2 days and with YOUR PSYCHIATRY specialist. Bring a copy of all of your results with you to your follow up appointments.   Return to the ER as discussed if you develop any new or worsening symptoms.     Take your medications as prescribed.

## 2023-12-27 NOTE — ED BEHAVIORAL HEALTH ASSESSMENT NOTE - NSBHPSYCHOLCOGABN_PSY_A_CORE
Pt unable to report what brought her here/disoriented to situation Pt unable to report what brought her here/disoriented to time/disoriented to place/disoriented to situation

## 2023-12-27 NOTE — ED BEHAVIORAL HEALTH ASSESSMENT NOTE - DESCRIPTION
Dementia 62 yo F with a PMH of dementia and depression, from [facility] presents for suicidal thoughts. Today at assisted living pt told staff that she "wanted to die" however has no plan. Was seen punching herself in her head (which she states she has done for years when she is upset). Told providers she hates her sisters for "putting her there (facility) and wants to live alone but they wont let her." States she has not been sleeping at night but has been eating/drinking normally. Denies nausea, vomiting, fever, chills, chest pain, SOB, abd pain, diarrhea, headache, dizziness, weakness, syncope. Of note, pt follows with Psych, Dr. Roel Major [phone]. Is on buspar 30mg BID and trazadone 50mg qhs. 62 yo F with a PMH of dementia and depression, from [facility] presents for suicidal thoughts. Today at assisted living pt told staff that she "wanted to die" however has no plan. Was seen punching herself in her head (which she states she has done for years when she is upset). Told providers she hates her sisters for "putting her there (facility) and wants to live alone but they wont let her." States she has not been sleeping at night but has been eating/drinking normally. Denies nausea, vomiting, fever, chills, chest pain, SOB, abd pain, diarrhea, headache, dizziness, weakness, syncope. Of note, pt follows with Psych, Dr. Roel Major [phone]. Is on buspar 30mg BID and trazadone 50mg qhs. anxious but cooperative    Vital Signs Last 24 Hrs  T(C): 37 (27 Dec 2023 16:24), Max: 37 (27 Dec 2023 16:24)  T(F): 98.6 (27 Dec 2023 16:24), Max: 98.6 (27 Dec 2023 16:24)  HR: 96 (27 Dec 2023 16:24) (87 - 96)  BP: 117/80 (27 Dec 2023 16:24) (117/80 - 123/81)  BP(mean): --  RR: 16 (27 Dec 2023 16:24) (16 - 20)  SpO2: 97% (27 Dec 2023 16:24) (97% - 99%)    Parameters below as of 27 Dec 2023 16:24  Patient On (Oxygen Delivery Method): room air single, has adult son, living in [place], dx with AD 8 yrs ago

## 2023-12-27 NOTE — ED BEHAVIORAL HEALTH ASSESSMENT NOTE - OTHER PAST PSYCHIATRIC HISTORY (INCLUDE DETAILS REGARDING ONSET, COURSE OF ILLNESS, INPATIENT/OUTPATIENT TREATMENT)
See HPI. longstanding h/o depression/anxiety, no prior psych admissions, remote SA via OD as a teenager, sees psychiatric NP at her [facility] for med management

## 2023-12-27 NOTE — ED BEHAVIORAL HEALTH ASSESSMENT NOTE - CURRENT MEDICATION
- Cefuroxime 500 mg oral tablet: 1 tab(s) orally 2 times a day  - Anusol-HC 2.5% topical cream: Apply topically to affected area 2 times a day  - Buspar 30 mg PO 2 times a day   - Trazadone 50 mg PO once a day at nighttime.

## 2023-12-27 NOTE — ED BEHAVIORAL HEALTH ASSESSMENT NOTE - NSSUICPROTFACT_PSY_ALL_CORE
Supportive social network of family or friends Supportive social network of family or friends/Positive therapeutic relationships

## 2023-12-27 NOTE — ED ADULT NURSE NOTE - NS ED NURSE AMBULANCES2
[facility]

## 2023-12-27 NOTE — ED ADULT NURSE REASSESSMENT NOTE - NS ED NURSE REASSESS COMMENT FT1
[address] [facility] to bring pt back The facility is willing to take her back eliza Patrick cleared ot to go back The sisters are very involved In her care and will Reach out to her Psychiatrist about her current medication Rachael [address] [facility] to bring pt back The facility is willing to take her back eliza Patrick cleared ot to go back The sisters are very involved In her care and will Reach out to her Psychiatrist about her current medication Rachael

## 2023-12-27 NOTE — ED BEHAVIORAL HEALTH ASSESSMENT NOTE - OTHER
[facility] living facility [phone] [facility] living facility [phone]

## 2023-12-27 NOTE — ED BEHAVIORAL HEALTH ASSESSMENT NOTE - NSBHATTESTCOMMENTATTENDFT_PSY_A_CORE
89F has adult child, domiciled in [place], with PPHx dementia x8 years and depression, remote prior SA via OD as a teenager, no prior psych admissions, no active substance abuse, with no signficiant PMH bib EMS activated by detention after pt was agitated this morning, hitting her head and stating she wants to die, psychiatry consulted for SI.  Pt on interview oriented to self only, unable to state her age or where she is, states she does not remember why she is here or what happened this morning.  Does not recall making suicidal statement, but states hitting her head is "something I do all the time."  Denies SI/HI presently, denies AVH/paranoia or access to guns.  Unhappy with her sisters for placing her in detention.  Collateral obtained from pt's sisters present in ER who state pt has advanced dementia progressing over the past 8 years, placed in detention where she has been generally happy.  Over the past 6 weeks has been more labile, crying spontaneously, no clear trigger for her dysphoria.  Deny concerns for pt being risk of harm to self/others; states passive SI statements are chronic and pt has no access to lethal means/weapons.  They state her neurologist recommended VPA but the [facility] was reluctant to start it 2/2 concerns pt would be too sedated.  Pt also has a psychiatric NP managing her antidepressants.  DDx includes progression of dementia vs. depression. Pt does not meet criteria for psychiatric admission at this time and recommend she f/u with own psychiatrist/neurologist after discharge.  Agree with trainee's assessment and plan as above. 89F has adult child, domiciled in [place], with PPHx dementia x8 years and depression, remote prior SA via OD as a teenager, no prior psych admissions, no active substance abuse, with no signficiant PMH bib EMS activated by [facility] after pt was agitated this morning, hitting her head and stating she wants to die, psychiatry consulted for SI.  Pt on interview oriented to self only, unable to state her age or where she is, states she does not remember why she is here or what happened this morning.  Does not recall making suicidal statement, but states hitting her head is "something I do all the time."  Denies SI/HI presently, denies AVH/paranoia or access to guns.  Unhappy with her sisters for placing her in [facility].  Collateral obtained from pt's sisters present in ER who state pt has advanced dementia progressing over the past 8 years, placed in [facility] where she has been generally happy.  Over the past 6 weeks has been more labile, crying spontaneously, no clear trigger for her dysphoria.  Deny concerns for pt being risk of harm to self/others; states passive SI statements are chronic and pt has no access to lethal means/weapons.  They state her neurologist recommended VPA but the [facility] was reluctant to start it 2/2 concerns pt would be too sedated.  Pt also has a psychiatric NP managing her antidepressants.  DDx includes progression of dementia vs. depression. Pt does not meet criteria for psychiatric admission at this time and recommend she f/u with own psychiatrist/neurologist after discharge.  Agree with trainee's assessment and plan as above.

## 2023-12-27 NOTE — ED PROVIDER NOTE - CLINICAL SUMMARY MEDICAL DECISION MAKING FREE TEXT BOX
LUCINDA Vasquez MD: 63F with PMH dementia, depression who presents from assisted living for +SI, seen punching self in head and told providers that she hates her sisters (HCPs). Pt otherwise eating/drinking normally. Plan: medical w/u, psych consult

## 2023-12-27 NOTE — ED BEHAVIORAL HEALTH ASSESSMENT NOTE - NSBHATTESTBILLING_PSY_A_CORE
[id number] diagnostic evaluation with medical services [id number] diagnostic evaluation with medical services

## 2023-12-27 NOTE — ED PROVIDER NOTE - DIFFERENTIAL DIAGNOSIS
Ddx includes, however, is not limited to: mood d/o, dementia, medical d/o, other Differential Diagnosis

## 2023-12-27 NOTE — ED BEHAVIORAL HEALTH ASSESSMENT NOTE - RISK ASSESSMENT
Risk factors: h/o SA    Protective factors: no current SIIP/HIIP, no h/o psych admissions, no psychosis, engaged in work or school, domiciled in assisted living facility, social supports, compliant with treatment Risk factors: passive SI statements, h/o depression, remote SA     Protective factors: no current SIIP/HIIP, no h/o psych admissions, no access to weapons, no active substance abuse, domiciled, social supports, positive therapeutic relationship, engaged in treatment, compliant with treatment, help-seeking behaviors, future-orientation    Overall, pt is at chronically elevated risk of harm mitigated by multiple protective factors and does not meet criteria for psychiatric admission at this time.

## 2023-12-27 NOTE — ED PROVIDER NOTE - PROGRESS NOTE DETAILS
Spoke with psych, Dr. Beard who reports pt is cleared from their perspective, no indication for psychiatric hospitalization at this time. Spoke with SW who assisted with communicating with [facility] assisted living, they do have a memory unit in their affiliated facility that pt qualifies for. They advised family/HCP reach out to them if they would like pt transferred there for placement. Spoke with sister/HCP, Candy Romero, [phone], was made aware of memory unit availability should pt require additional monitoring given hx of dementia. States for now she is ok with pt being transferred back to [facility] and feels safe that pt go back there at this time. Pt w/o complaints, cooperative and calm. No SI/HI. Awaiting non emergent. Colt Rivera PA-C Spoke with psych, Dr. Beard who reports pt is cleared from their perspective, no indication for psychiatric hospitalization at this time. Spoke with SW who assisted with communicating with [facility] assisted living, they do have a memory unit in their affiliated facility that pt qualifies for. They advised family/HCP reach out to them if they would like pt transferred there for placement. Spoke with sister/HCP, Candy Romero, [phone], was made aware of memory unit availability should pt require additional monitoring given hx of dementia. States for now she is ok with pt being transferred back to [facility] and feels safe that pt go back there at this time. Pt w/o complaints, cooperative and calm. No SI/HI. Awaiting non emergent. Colt Rivera PA-C

## 2023-12-27 NOTE — ED BEHAVIORAL HEALTH ASSESSMENT NOTE - HPI (INCLUDE ILLNESS QUALITY, SEVERITY, DURATION, TIMING, CONTEXT, MODIFYING FACTORS, ASSOCIATED SIGNS AND SYMPTOMS)
62 y/o female, , two adult children (son and daughter), domiciled in assisted living facility ([facility]), sister acting as healthcare proxy. PPHx of depression, anxiety, and prior hx of SA/SI/NSSIB, with no known hx of psychiatric hospitalizations or hx of significant withdrawals/substance use. PMHx of dementia. Actively admitted for suicidal ideation and depression. Psychiatry consulted for suicidal ideation and depression.     Pt is a limited historian 2/2 to dementia. Reports having a "meltdown" which lead her to the hospital, but is unable to remember exactly what happened. States nothing in particular triggered the episode to her knowledge, but endorses depression d/t her current living situation. Reports her sisters sent her to live at the assisted living facility against her wishes. Expresses strong hatred towards both her sister for this reason. Denies mistreatment or feeling unsafe in her current living situation. States she has formed good friendships there. Reports passive suicidal ideation, but denies current plan or attempt.     Per worker at assisted living facility, pt was sent to the hospital for evaluation after she was found beating herself in the head and saying she wanted to die. She states the pt has always been depressed, but endorses no previous SA or NSSIB. Pt has been living at [facility] for ~1 year.     Per pt's family this episode is 2/2 worsening dementia. Endorse she is emotionally labile. Report suicide attempt as a teenager, but express no concerns for her safety at this time. 64 y/o female, , two adult children (son and daughter), domiciled in assisted living facility ([facility]), sister acting as healthcare proxy. PPHx of depression, anxiety, and prior hx of SA/SI/NSSIB, with no known hx of psychiatric hospitalizations or hx of significant withdrawals/substance use. PMHx of dementia. Actively admitted for suicidal ideation and depression. Psychiatry consulted for suicidal ideation and depression.     Pt is a limited historian 2/2 to dementia. Reports having a "meltdown" which lead her to the hospital, but is unable to remember exactly what happened. States nothing in particular triggered the episode to her knowledge, but endorses depression d/t her current living situation. Reports her sisters sent her to live at the assisted living facility against her wishes. Expresses strong hatred towards both her sister for this reason. Denies mistreatment or feeling unsafe in her current living situation. States she has formed good friendships there. Reports passive suicidal ideation, but denies current plan or attempt.     Per worker at assisted living facility, pt was sent to the hospital for evaluation after she was found beating herself in the head and saying she wanted to die. She states the pt has always been depressed, but endorses no previous SA or NSSIB. Pt has been living at [facility] for ~1 year.     Per pt's family this episode is 2/2 worsening dementia. Endorse she is emotionally labile. Report suicide attempt as a teenager, but express no concerns for her safety at this time. 64 y/o female, , two adult children (son and daughter), domiciled in assisted living facility ([facility]), sister acting as healthcare proxy. PPHx of depression, anxiety, and prior hx of SA/SI/NSSIB, with no known hx of psychiatric hospitalizations or hx of significant withdrawals/substance use. PMHx of dementia. In ER to be evaluated for suicidal ideation and depression. Psychiatry consulted for suicidal ideation and depression.     Pt is a limited historian 2/2 to dementia. Reports having a "meltdown" which lead her to the hospital, but is unable to remember exactly what happened. States nothing in particular triggered the episode to her knowledge, but endorses depression d/t her current living situation. Reports her sisters sent her to live at the assisted living facility against her wishes. Expresses resentment towards both her sister for this reason. Denies mistreatment or feeling unsafe in her current living situation. States she has formed good friendships there. Denies active or passive SIIP/HIIP.    Per worker at assisted living facility, pt was sent to the hospital for evaluation after she was found hitting herself in the head and saying she wanted to die. She states the pt has always been depressed, but endorses no previous SA or NSSIB. Pt has been living at [facility] for ~1 year.  No specific plan for suicide mentioned.    Per pt's sister's present in ER, pt has had worsening dementia x 8 years.  Endorse that she is emotionally labile, for past 6 weeks having frequent bouts of crying, seemingly unprovoked. Deny pt having access to guns/meds/weapons/lethal means in her residence and do not have concerns about pt being a risk of harm to self/others. 62 y/o female, , two adult children (son and daughter), domiciled in assisted living facility ([facility]), sister acting as healthcare proxy. PPHx of depression, anxiety, and prior hx of SA/SI/NSSIB, with no known hx of psychiatric hospitalizations or hx of significant withdrawals/substance use. PMHx of dementia. In ER to be evaluated for suicidal ideation and depression. Psychiatry consulted for suicidal ideation and depression.     Pt is a limited historian 2/2 to dementia. Reports having a "meltdown" which lead her to the hospital, but is unable to remember exactly what happened. States nothing in particular triggered the episode to her knowledge, but endorses depression d/t her current living situation. Reports her sisters sent her to live at the assisted living facility against her wishes. Expresses resentment towards both her sister for this reason. Denies mistreatment or feeling unsafe in her current living situation. States she has formed good friendships there. Denies active or passive SIIP/HIIP.    Per worker at assisted living facility, pt was sent to the hospital for evaluation after she was found hitting herself in the head and saying she wanted to die. She states the pt has always been depressed, but endorses no previous SA or NSSIB. Pt has been living at [facility] for ~1 year.  No specific plan for suicide mentioned.    Per pt's sister's present in ER, pt has had worsening dementia x 8 years.  Endorse that she is emotionally labile, for past 6 weeks having frequent bouts of crying, seemingly unprovoked. Deny pt having access to guns/meds/weapons/lethal means in her residence and do not have concerns about pt being a risk of harm to self/others.

## 2023-12-27 NOTE — ED PROVIDER NOTE - PATIENT PORTAL LINK FT
You can access the FollowMyHealth Patient Portal offered by [facility] by registering at the following website: [email or web address]. By joining [employer]’s FollowMyHealth portal, you will also be able to view your health information using other applications (apps) compatible with our system. You can access the FollowMyHealth Patient Portal offered by [facility] by registering at the following website: [email or web address]. By joining [employer]’s FollowMyHealth portal, you will also be able to view your health information using other applications (apps) compatible with our system.

## 2023-12-27 NOTE — ED ADULT NURSE NOTE - NSFALLUNIVINTERV_ED_ALL_ED
Bed/Stretcher in lowest position, wheels locked, appropriate side rails in place/Call bell, personal items and telephone in reach/Instruct patient to call for assistance before getting out of bed/chair/stretcher/Non-slip footwear applied when patient is off stretcher/[place] to call system/Physically safe environment - no spills, clutter or unnecessary equipment/Purposeful proactive rounding/Room/bathroom lighting operational, light cord in reach Bed/Stretcher in lowest position, wheels locked, appropriate side rails in place/Call bell, personal items and telephone in reach/Instruct patient to call for assistance before getting out of bed/chair/stretcher/Non-slip footwear applied when patient is off stretcher/[place] to call system/Physically safe environment - no spills, clutter or unnecessary equipment/Purposeful proactive rounding/Room/bathroom lighting operational, light cord in reach

## 2023-12-27 NOTE — ED PROVIDER NOTE - OBJECTIVE STATEMENT
64 yo F with a PMH of dementia and depression, from [facility] presents for suicidal thoughts. Today at assisted living pt told staff that she "wanted to die" however has no plan. Was seen punching herself in her head (which she states she has done for years when she is upset). Told providers she hates her sisters for "putting her there (facility) and wants to live alone but they wont let her." States she has not been sleeping at night but has been eating/drinking normally. Denies nausea, vomiting, fever, chills, chest pain, SOB, abd pain, diarrhea, headache, dizziness, weakness, syncope. Of note, pt follows with Psych, Dr. Roel Major [phone].  Is on buspar 30mg BID and trazadone 50mg qhs. 64 yo F with a PMH of dementia and depression, from [facility] presents for suicidal thoughts. Today at assisted living pt told staff that she "wanted to die" however has no plan. Was seen punching herself in her head (which she states she has done for years when she is upset). Told providers she hates her sisters for "putting her there (facility) and wants to live alone but they wont let her." States she has not been sleeping at night but has been eating/drinking normally. Denies nausea, vomiting, fever, chills, chest pain, SOB, abd pain, diarrhea, headache, dizziness, weakness, syncope. Of note, pt follows with Psych, Dr. Roel Major [phone].  Is on buspar 30mg BID and trazadone 50mg qhs.

## 2023-12-27 NOTE — ED PROVIDER NOTE - PHYSICAL EXAMINATION
CONSTITUTIONAL: In no apparent distress. On constant obs.   ENT: Airway patent, moist mucous membranes.   EYES: Pupils equal, round and reactive to light. EOMI. Conjunctiva normal appearing.   CARDIAC: Normal rate, regular rhythm.  Heart sounds S1, S2.    RESPIRATORY: Breath sounds clear and equal bilaterally.   GASTROINTESTINAL: Abdomen soft, non-tender, not distended.  MUSCULOSKELETAL: Spine appears normal.  NEUROLOGICAL: Alert and oriented x3, no focal deficits, no motor or sensory deficits. 5/5 muscle strength throughout.  SKIN: Skin normal color, warm, dry and intact.   PSYCHIATRIC: Tearful, depressed. No SI/HI. Not combative. Cooperative.

## 2023-12-27 NOTE — ED BEHAVIORAL HEALTH ASSESSMENT NOTE - VIOLENCE PROTECTIVE FACTORS:
Residential stability/Relationship stability Residential stability/Relationship stability/Sobriety/Good treatment response/compliance

## 2023-12-27 NOTE — ED ADULT NURSE REASSESSMENT NOTE - NS ED NURSE REASSESS COMMENT FT1
1507 Pending [facility] ambulance pt safety maintained Carmencita 1507 Pending [facility] ambulance pt safety maintained Carmencita

## 2024-01-01 NOTE — ED ADULT TRIAGE NOTE - AS PAIN REST
[place] standing orders have been placed. Refer to infant’s chart for further details. 0 (no pain/absence of nonverbal indicators of pain)

## 2024-08-16 ENCOUNTER — INPATIENT (INPATIENT)
Facility: HOSPITAL | Age: 64
LOS: 6 days | Discharge: NURSING, REHAB OR LONG-TERM CARE | DRG: 551 | End: 2024-08-23
Attending: SURGERY | Admitting: SURGERY
Payer: MEDICARE

## 2024-08-16 VITALS
OXYGEN SATURATION: 98 % | TEMPERATURE: 97 F | HEIGHT: 63 IN | RESPIRATION RATE: 18 BRPM | WEIGHT: 130.07 LBS | DIASTOLIC BLOOD PRESSURE: 70 MMHG | HEART RATE: 97 BPM | SYSTOLIC BLOOD PRESSURE: 129 MMHG

## 2024-08-16 DIAGNOSIS — F03.90 UNSPECIFIED DEMENTIA, UNSPECIFIED SEVERITY, WITHOUT BEHAVIORAL DISTURBANCE, PSYCHOTIC DISTURBANCE, MOOD DISTURBANCE, AND ANXIETY: ICD-10-CM

## 2024-08-16 LAB
ALBUMIN SERPL ELPH-MCNC: 3.8 G/DL — SIGNIFICANT CHANGE UP (ref 3.3–5)
ALP SERPL-CCNC: 116 U/L — SIGNIFICANT CHANGE UP (ref 40–120)
ALT FLD-CCNC: 39 U/L — SIGNIFICANT CHANGE UP (ref 12–78)
ANION GAP SERPL CALC-SCNC: 18 MMOL/L — HIGH (ref 5–17)
APAP SERPL-MCNC: 3 UG/ML — LOW (ref 10–30)
APPEARANCE UR: CLEAR — SIGNIFICANT CHANGE UP
APTT BLD: 29.5 SEC — SIGNIFICANT CHANGE UP (ref 24.5–35.6)
AST SERPL-CCNC: 37 U/L — SIGNIFICANT CHANGE UP (ref 15–37)
BACTERIA # UR AUTO: ABNORMAL /HPF
BASOPHILS # BLD AUTO: 0.05 K/UL — SIGNIFICANT CHANGE UP (ref 0–0.2)
BASOPHILS NFR BLD AUTO: 0.3 % — SIGNIFICANT CHANGE UP (ref 0–2)
BILIRUB SERPL-MCNC: 0.6 MG/DL — SIGNIFICANT CHANGE UP (ref 0.2–1.2)
BILIRUB UR-MCNC: NEGATIVE — SIGNIFICANT CHANGE UP
BUN SERPL-MCNC: 25 MG/DL — HIGH (ref 7–23)
CALCIUM SERPL-MCNC: 10.1 MG/DL — SIGNIFICANT CHANGE UP (ref 8.5–10.1)
CAST: 4 /LPF — SIGNIFICANT CHANGE UP (ref 0–4)
CHLORIDE SERPL-SCNC: 107 MMOL/L — SIGNIFICANT CHANGE UP (ref 96–108)
CO2 SERPL-SCNC: 15 MMOL/L — LOW (ref 22–31)
COLOR SPEC: YELLOW — SIGNIFICANT CHANGE UP
CREAT SERPL-MCNC: 1.34 MG/DL — HIGH (ref 0.5–1.3)
DIFF PNL FLD: NEGATIVE — SIGNIFICANT CHANGE UP
EGFR: 45 ML/MIN/1.73M2 — LOW
EOSINOPHIL # BLD AUTO: 0.01 K/UL — SIGNIFICANT CHANGE UP (ref 0–0.5)
EOSINOPHIL NFR BLD AUTO: 0.1 % — SIGNIFICANT CHANGE UP (ref 0–6)
EPI CELLS # UR: PRESENT
ETHANOL SERPL-MCNC: <10 MG/DL — SIGNIFICANT CHANGE UP (ref 0–10)
FLUAV AG NPH QL: SIGNIFICANT CHANGE UP
FLUBV AG NPH QL: SIGNIFICANT CHANGE UP
GLUCOSE SERPL-MCNC: 132 MG/DL — HIGH (ref 70–99)
GLUCOSE UR QL: NEGATIVE MG/DL — SIGNIFICANT CHANGE UP
HCT VFR BLD CALC: 45.7 % — HIGH (ref 34.5–45)
HGB BLD-MCNC: 14.6 G/DL — SIGNIFICANT CHANGE UP (ref 11.5–15.5)
HYALINE CASTS # UR AUTO: PRESENT
IMM GRANULOCYTES NFR BLD AUTO: 1 % — HIGH (ref 0–0.9)
INR BLD: 0.95 RATIO — SIGNIFICANT CHANGE UP (ref 0.85–1.18)
KETONES UR-MCNC: 15 MG/DL
LEUKOCYTE ESTERASE UR-ACNC: ABNORMAL
LIDOCAIN IGE QN: 24 U/L — SIGNIFICANT CHANGE UP (ref 13–75)
LYMPHOCYTES # BLD AUTO: 16 % — SIGNIFICANT CHANGE UP (ref 13–44)
LYMPHOCYTES # BLD AUTO: 2.61 K/UL — SIGNIFICANT CHANGE UP (ref 1–3.3)
MAGNESIUM SERPL-MCNC: 2.4 MG/DL — SIGNIFICANT CHANGE UP (ref 1.6–2.6)
MCHC RBC-ENTMCNC: 31.9 GM/DL — LOW (ref 32–36)
MCHC RBC-ENTMCNC: 32.3 PG — SIGNIFICANT CHANGE UP (ref 27–34)
MCV RBC AUTO: 101.1 FL — HIGH (ref 80–100)
MONOCYTES # BLD AUTO: 1.18 K/UL — HIGH (ref 0–0.9)
MONOCYTES NFR BLD AUTO: 7.2 % — SIGNIFICANT CHANGE UP (ref 2–14)
NEUTROPHILS # BLD AUTO: 12.32 K/UL — HIGH (ref 1.8–7.4)
NEUTROPHILS NFR BLD AUTO: 75.4 % — SIGNIFICANT CHANGE UP (ref 43–77)
NITRITE UR-MCNC: NEGATIVE — SIGNIFICANT CHANGE UP
PH UR: 5.5 — SIGNIFICANT CHANGE UP (ref 5–8)
PLATELET # BLD AUTO: 307 K/UL — SIGNIFICANT CHANGE UP (ref 150–400)
POTASSIUM SERPL-MCNC: 3.8 MMOL/L — SIGNIFICANT CHANGE UP (ref 3.5–5.3)
POTASSIUM SERPL-SCNC: 3.8 MMOL/L — SIGNIFICANT CHANGE UP (ref 3.5–5.3)
PROT SERPL-MCNC: 7.6 GM/DL — SIGNIFICANT CHANGE UP (ref 6–8.3)
PROT UR-MCNC: SIGNIFICANT CHANGE UP MG/DL
PROTHROM AB SERPL-ACNC: 10.8 SEC — SIGNIFICANT CHANGE UP (ref 9.5–13)
RBC # BLD: 4.52 M/UL — SIGNIFICANT CHANGE UP (ref 3.8–5.2)
RBC # FLD: 13.3 % — SIGNIFICANT CHANGE UP (ref 10.3–14.5)
RBC CASTS # UR COMP ASSIST: 4 /HPF — SIGNIFICANT CHANGE UP (ref 0–4)
RSV RNA NPH QL NAA+NON-PROBE: SIGNIFICANT CHANGE UP
SALICYLATES SERPL-MCNC: <1.7 MG/DL — LOW (ref 2.8–20)
SARS-COV-2 RNA SPEC QL NAA+PROBE: SIGNIFICANT CHANGE UP
SODIUM SERPL-SCNC: 140 MMOL/L — SIGNIFICANT CHANGE UP (ref 135–145)
SP GR SPEC: 1.02 — SIGNIFICANT CHANGE UP (ref 1–1.03)
SQUAMOUS # UR AUTO: 3 /HPF — SIGNIFICANT CHANGE UP (ref 0–5)
TROPONIN I, HIGH SENSITIVITY RESULT: 10.91 NG/L — SIGNIFICANT CHANGE UP
UROBILINOGEN FLD QL: 0.2 MG/DL — SIGNIFICANT CHANGE UP (ref 0.2–1)
WBC # BLD: 16.33 K/UL — HIGH (ref 3.8–10.5)
WBC # FLD AUTO: 16.33 K/UL — HIGH (ref 3.8–10.5)
WBC UR QL: 4 /HPF — SIGNIFICANT CHANGE UP (ref 0–5)

## 2024-08-16 PROCEDURE — 85014 HEMATOCRIT: CPT

## 2024-08-16 PROCEDURE — 80053 COMPREHEN METABOLIC PANEL: CPT

## 2024-08-16 PROCEDURE — 87086 URINE CULTURE/COLONY COUNT: CPT

## 2024-08-16 PROCEDURE — 95816 EEG AWAKE AND DROWSY: CPT

## 2024-08-16 PROCEDURE — 36415 COLL VENOUS BLD VENIPUNCTURE: CPT

## 2024-08-16 PROCEDURE — 71045 X-RAY EXAM CHEST 1 VIEW: CPT | Mod: 26

## 2024-08-16 PROCEDURE — 99223 1ST HOSP IP/OBS HIGH 75: CPT | Mod: 57

## 2024-08-16 PROCEDURE — 73502 X-RAY EXAM HIP UNI 2-3 VIEWS: CPT | Mod: 26,LT

## 2024-08-16 PROCEDURE — 97163 PT EVAL HIGH COMPLEX 45 MIN: CPT | Mod: GP

## 2024-08-16 PROCEDURE — 97530 THERAPEUTIC ACTIVITIES: CPT | Mod: GP

## 2024-08-16 PROCEDURE — 72170 X-RAY EXAM OF PELVIS: CPT | Mod: 26,XE

## 2024-08-16 PROCEDURE — 82140 ASSAY OF AMMONIA: CPT

## 2024-08-16 PROCEDURE — 99291 CRITICAL CARE FIRST HOUR: CPT

## 2024-08-16 PROCEDURE — 73552 X-RAY EXAM OF FEMUR 2/>: CPT | Mod: 26,LT

## 2024-08-16 PROCEDURE — 83735 ASSAY OF MAGNESIUM: CPT

## 2024-08-16 PROCEDURE — 99221 1ST HOSP IP/OBS SF/LOW 40: CPT | Mod: 57,GC

## 2024-08-16 PROCEDURE — 74177 CT ABD & PELVIS W/CONTRAST: CPT | Mod: 26,MC

## 2024-08-16 PROCEDURE — 27220 TREAT HIP SOCKET FRACTURE: CPT | Mod: LT

## 2024-08-16 PROCEDURE — 70450 CT HEAD/BRAIN W/O DYE: CPT | Mod: 26,MC

## 2024-08-16 PROCEDURE — 70450 CT HEAD/BRAIN W/O DYE: CPT | Mod: MC

## 2024-08-16 PROCEDURE — 80048 BASIC METABOLIC PNL TOTAL CA: CPT

## 2024-08-16 PROCEDURE — 97110 THERAPEUTIC EXERCISES: CPT | Mod: GP

## 2024-08-16 PROCEDURE — 85018 HEMOGLOBIN: CPT

## 2024-08-16 PROCEDURE — 93005 ELECTROCARDIOGRAM TRACING: CPT

## 2024-08-16 PROCEDURE — 99223 1ST HOSP IP/OBS HIGH 75: CPT

## 2024-08-16 PROCEDURE — 97116 GAIT TRAINING THERAPY: CPT | Mod: GP

## 2024-08-16 PROCEDURE — 93010 ELECTROCARDIOGRAM REPORT: CPT

## 2024-08-16 PROCEDURE — 85027 COMPLETE CBC AUTOMATED: CPT

## 2024-08-16 PROCEDURE — 84145 PROCALCITONIN (PCT): CPT

## 2024-08-16 PROCEDURE — 71260 CT THORAX DX C+: CPT | Mod: 26,MC

## 2024-08-16 PROCEDURE — 84100 ASSAY OF PHOSPHORUS: CPT

## 2024-08-16 PROCEDURE — 73030 X-RAY EXAM OF SHOULDER: CPT | Mod: LT

## 2024-08-16 RX ORDER — LIDOCAINE/BENZALKONIUM/ALCOHOL
1 SOLUTION, NON-ORAL TOPICAL EVERY 24 HOURS
Refills: 0 | Status: DISCONTINUED | OUTPATIENT
Start: 2024-08-16 | End: 2024-08-23

## 2024-08-16 RX ORDER — ACETAMINOPHEN 325 MG/1
1000 TABLET ORAL EVERY 6 HOURS
Refills: 0 | Status: DISCONTINUED | OUTPATIENT
Start: 2024-08-16 | End: 2024-08-23

## 2024-08-16 RX ORDER — ONDANSETRON 2 MG/ML
4 INJECTION, SOLUTION INTRAMUSCULAR; INTRAVENOUS EVERY 6 HOURS
Refills: 0 | Status: DISCONTINUED | OUTPATIENT
Start: 2024-08-16 | End: 2024-08-16

## 2024-08-16 RX ORDER — FOLIC ACID/MULTIVIT,IRON,MINER 0.4MG-18MG
2 TABLET,CHEWABLE ORAL
Refills: 0 | DISCHARGE

## 2024-08-16 RX ORDER — DONEPEZIL HYDROCHLORIDE 5 MG/1
1 TABLET, FILM COATED ORAL
Refills: 0 | DISCHARGE

## 2024-08-16 RX ORDER — LORAZEPAM 4 MG/ML
2 INJECTION INTRAMUSCULAR; INTRAVENOUS ONCE
Refills: 0 | Status: DISCONTINUED | OUTPATIENT
Start: 2024-08-16 | End: 2024-08-16

## 2024-08-16 RX ORDER — MIRTAZAPINE 30 MG
1 TABLET ORAL
Refills: 0 | DISCHARGE

## 2024-08-16 RX ORDER — LORAZEPAM 4 MG/ML
1 INJECTION INTRAMUSCULAR; INTRAVENOUS ONCE
Refills: 0 | Status: DISCONTINUED | OUTPATIENT
Start: 2024-08-16 | End: 2024-08-16

## 2024-08-16 RX ORDER — BUPROPION HYDROCHLORIDE 150 MG/1
1 TABLET ORAL
Refills: 0 | DISCHARGE

## 2024-08-16 RX ORDER — OXYBUTYNIN CHLORIDE 5 MG/1
1 TABLET ORAL
Refills: 0 | DISCHARGE

## 2024-08-16 RX ORDER — SODIUM CHLORIDE 9 MG/ML
1000 INJECTION INTRAMUSCULAR; INTRAVENOUS; SUBCUTANEOUS
Refills: 0 | Status: DISCONTINUED | OUTPATIENT
Start: 2024-08-16 | End: 2024-08-19

## 2024-08-16 RX ORDER — OXYBUTYNIN CHLORIDE 5 MG/1
5 TABLET ORAL DAILY
Refills: 0 | Status: DISCONTINUED | OUTPATIENT
Start: 2024-08-16 | End: 2024-08-23

## 2024-08-16 RX ORDER — DOCUSATE CALCIUM 240 MG/1
1 CAPSULE ORAL
Refills: 0 | DISCHARGE

## 2024-08-16 RX ORDER — HYDROXYZINE HCL 25 MG
1 TABLET ORAL
Refills: 0 | DISCHARGE

## 2024-08-16 RX ORDER — ONDANSETRON 2 MG/ML
4 INJECTION, SOLUTION INTRAMUSCULAR; INTRAVENOUS EVERY 6 HOURS
Refills: 0 | Status: DISCONTINUED | OUTPATIENT
Start: 2024-08-16 | End: 2024-08-23

## 2024-08-16 RX ORDER — MIRTAZAPINE 30 MG
2 TABLET ORAL
Refills: 0 | DISCHARGE

## 2024-08-16 RX ORDER — SODIUM CHLORIDE 9 MG/ML
1000 INJECTION INTRAMUSCULAR; INTRAVENOUS; SUBCUTANEOUS ONCE
Refills: 0 | Status: COMPLETED | OUTPATIENT
Start: 2024-08-16 | End: 2024-08-16

## 2024-08-16 RX ORDER — CYANOCOBALAMIN (VITAMIN B-12) 500MCG/0.1
1 GEL (ML) NASAL
Refills: 0 | DISCHARGE

## 2024-08-16 RX ORDER — MEMANTINE 7 MG/1
10 CAPSULE, EXTENDED RELEASE ORAL
Refills: 0 | Status: DISCONTINUED | OUTPATIENT
Start: 2024-08-16 | End: 2024-08-23

## 2024-08-16 RX ORDER — BUSPIRONE HYDROCHLORIDE 30 MG/1
15 TABLET ORAL
Refills: 0 | Status: DISCONTINUED | OUTPATIENT
Start: 2024-08-16 | End: 2024-08-23

## 2024-08-16 RX ORDER — MEMANTINE 7 MG/1
1 CAPSULE, EXTENDED RELEASE ORAL
Refills: 0 | DISCHARGE

## 2024-08-16 RX ORDER — DONEPEZIL HYDROCHLORIDE 5 MG/1
10 TABLET, FILM COATED ORAL AT BEDTIME
Refills: 0 | Status: DISCONTINUED | OUTPATIENT
Start: 2024-08-16 | End: 2024-08-23

## 2024-08-16 RX ORDER — MIRTAZAPINE 30 MG
30 TABLET ORAL AT BEDTIME
Refills: 0 | Status: DISCONTINUED | OUTPATIENT
Start: 2024-08-16 | End: 2024-08-23

## 2024-08-16 RX ORDER — BUSPIRONE HYDROCHLORIDE 30 MG/1
1 TABLET ORAL
Refills: 0 | DISCHARGE

## 2024-08-16 RX ORDER — CRANBERRY FRUIT EXTRACT 300 MG
1 TABLET ORAL
Refills: 0 | DISCHARGE

## 2024-08-16 RX ORDER — METHENAMINE MANDELATE 1 G
1 TABLET ORAL
Refills: 0 | DISCHARGE

## 2024-08-16 RX ADMIN — SODIUM CHLORIDE 1000 MILLILITER(S): 9 INJECTION INTRAMUSCULAR; INTRAVENOUS; SUBCUTANEOUS at 16:01

## 2024-08-16 RX ADMIN — LORAZEPAM 1 MILLIGRAM(S): 4 INJECTION INTRAMUSCULAR; INTRAVENOUS at 20:34

## 2024-08-16 RX ADMIN — LORAZEPAM 2 MILLIGRAM(S): 4 INJECTION INTRAMUSCULAR; INTRAVENOUS at 23:23

## 2024-08-16 RX ADMIN — LORAZEPAM 2 MILLIGRAM(S): 4 INJECTION INTRAMUSCULAR; INTRAVENOUS at 15:47

## 2024-08-16 NOTE — CONSULT NOTE ADULT - SUBJECTIVE AND OBJECTIVE BOX
Date of service  is equal to the date of entry    Patient is a 63y old  Female who presents with a chief complaint of   PAST MEDICAL & SURGICAL HISTORY:  Dementia        AURELIO LIVIA   63y    Female    BRIEF HOSPITAL COURSE:    Review of Systems:                       All other ROS are negative.    Allergies    No Known Allergies    Intolerances      Weight (kg): 59 (24 @ 14:30)  BMI (kg/m2): 23 (24 @ 14:30)    ICU Vital Signs Last 24 Hrs  T(C): 37.2 (16 Aug 2024 16:06), Max: 37.2 (16 Aug 2024 16:06)  T(F): 98.9 (16 Aug 2024 16:06), Max: 98.9 (16 Aug 2024 16:06)  HR: 104 (16 Aug 2024 16:40) (97 - 122)  BP: 126/61 (16 Aug 2024 16:40) (115/96 - 129/70)  BP(mean): 83 (16 Aug 2024 16:40) (83 - 103)  ABP: --  ABP(mean): --  RR: 18 (16 Aug 2024 16:40) (18 - 20)  SpO2: 100% (16 Aug 2024 16:40) (98% - 100%)    O2 Parameters below as of 16 Aug 2024 16:40  Patient On (Oxygen Delivery Method): nasal cannula  O2 Flow (L/min): 2        Physical Examination:    General:     HEENT:     PULM:     CVS:     ABD:     EXT:     SKIN:     Neuro:          LABS:                        14.6   16.33 )-----------( 307      ( 16 Aug 2024 15:44 )             45.7     -16    140  |  107  |  25<H>  ----------------------------<  132<H>  3.8   |  15<L>  |  1.34<H>    Ca    10.1      16 Aug 2024 15:44  Mg     2.4     -16    TPro  7.6  /  Alb  3.8  /  TBili  0.6  /  DBili  x   /  AST  37  /  ALT  39  /  AlkPhos  116  08-16          CAPILLARY BLOOD GLUCOSE      POCT Blood Glucose.: 128 mg/dL (16 Aug 2024 15:42)    PT/INR - ( 16 Aug 2024 15:44 )   PT: 10.8 sec;   INR: 0.95 ratio         PTT - ( 16 Aug 2024 15:44 )  PTT:29.5 sec  Urinalysis Basic - ( 16 Aug 2024 15:59 )    Color: Yellow / Appearance: Clear / S.025 / pH: x  Gluc: x / Ketone: 15 mg/dL  / Bili: Negative / Urobili: 0.2 mg/dL   Blood: x / Protein: Trace mg/dL / Nitrite: Negative   Leuk Esterase: Small / RBC: 4 /HPF / WBC 4 /HPF   Sq Epi: x / Non Sq Epi: 3 /HPF / Bacteria: Occasional /HPF      CULTURES:      Medications:  MEDICATIONS  (STANDING):  busPIRone 15 milliGRAM(s) Oral two times a day  donepezil 10 milliGRAM(s) Oral at bedtime  lidocaine   4% Patch 1 Patch Transdermal every 24 hours  memantine 10 milliGRAM(s) Oral two times a day  mirtazapine 30 milliGRAM(s) Oral at bedtime  oxybutynin 5 milliGRAM(s) Oral daily  sodium chloride 0.9%. 1000 milliLiter(s) (100 mL/Hr) IV Continuous <Continuous>    MEDICATIONS  (PRN):  acetaminophen   IVPB .. 1000 milliGRAM(s) IV Intermittent every 6 hours PRN Temp greater or equal to 38C (100.4F), Mild Pain (1 - 3)  morphine  - Injectable 2 milliGRAM(s) IV Push every 4 hours PRN Moderate Pain (4 - 6)  morphine  - Injectable 4 milliGRAM(s) IV Push every 4 hours PRN Severe Pain (7 - 10)  ondansetron Injectable 4 milliGRAM(s) IV Push every 6 hours PRN Nausea          RADIOLOGY/IMAGING/ECHO      < from: CT Chest w/ IV Cont (24 @ 21:21) >  IMPRESSION:  Motion degraded exam.  No main, lobar or segmental pulmonary embolism.  Age-indeterminate T7-T9 mildcompression fractures.      < end of copied text >  < from: CT Abdomen and Pelvis w/ IV Cont (24 @ 17:31) >  IMPRESSION:    Acute left acetabular comminuted fracture primarily involving the medial   and posterior compartments of the acetabulum. Fracture is noted at the   left superior pubic ramus-  acetabular junction as well. There is   superior displacement of the left femoral head.    Large left pelvic extraperitoneal hematoma involving the sciatic foramen   and left pelvic sidewall related to comminuted left pelvic acetabular   fracture. Vessel injury is difficult to exclude. For example superior   gluteal artery courses very close to one of the fracture fragments.   Streak artifact limits evaluation for active bleeding.    Also, there is mild T9 compression fracture, which appears acute.   Clinical correlation is recommended. Correlate with dedicated thoracic   spine imaging.    Dr. Brandt discussed these findings with Dr. Purnima Vazquez from ER on   2024 at 6:10 PM, with read back.          Assessment/Plan:    63F hx early dementia     Admit from [facility] with T 7-9 vertebral compression fx   L acetabular fx  L femoral head fx and Left sup pubic ramus fx  with ABLA Pelvic hematoma     There was no witnessed fall, however she had what was described as a generalized sz of new onset.    New Seizure > Neuro consult  Low dose benzo for now was taking at [facility] and stopped , likely representing benzo withdrawal sz  TIMUR from baseline> IVF for 12 hrs   Serial CBC              Date of service  is equal to the date of entry    Patient is a 63y old  Female who presents with a chief complaint of   PAST MEDICAL & SURGICAL HISTORY:  Dementia        AURELIO LIVIA   63y    Female    BRIEF HOSPITAL COURSE:    Review of Systems:   [place]ATO                    All other ROS are negative.    Allergies    No Known Allergies    Intolerances      Weight (kg): 59 (24 @ 14:30)  BMI (kg/m2): 23 (24 @ 14:30)    ICU Vital Signs Last 24 Hrs  T(C): 37.2 (16 Aug 2024 16:06), Max: 37.2 (16 Aug 2024 16:06)  T(F): 98.9 (16 Aug 2024 16:06), Max: 98.9 (16 Aug 2024 16:06)  HR: 104 (16 Aug 2024 16:40) (97 - 122)  BP: 126/61 (16 Aug 2024 16:40) (115/96 - 129/70)  BP(mean): 83 (16 Aug 2024 16:40) (83 - 103)  ABP: --  ABP(mean): --  RR: 18 (16 Aug 2024 16:40) (18 - 20)  SpO2: 100% (16 Aug 2024 16:40) (98% - 100%)    O2 Parameters below as of 16 Aug 2024 16:40  Patient On (Oxygen Delivery Method): nasal cannula  O2 Flow (L/min): 2        Physical Examination:    General:  NAD    HEENT: No JVD    PULM: bilateral BS     CVS: s1 s2 reg    ABD: soft NT     EXT:  no edema   L hip tenderness to palp     SKIN: warm      Neuro: confused           LABS:                        14.6   16.33 )-----------( 307      ( 16 Aug 2024 15:44 )             45.7         140  |  107  |  25<H>  ----------------------------<  132<H>  3.8   |  15<L>  |  1.34<H>    Ca    10.1      16 Aug 2024 15:44  Mg     2.4         TPro  7.6  /  Alb  3.8  /  TBili  0.6  /  DBili  x   /  AST  37  /  ALT  39  /  AlkPhos  116            CAPILLARY BLOOD GLUCOSE      POCT Blood Glucose.: 128 mg/dL (16 Aug 2024 15:42)    PT/INR - ( 16 Aug 2024 15:44 )   PT: 10.8 sec;   INR: 0.95 ratio         PTT - ( 16 Aug 2024 15:44 )  PTT:29.5 sec  Urinalysis Basic - ( 16 Aug 2024 15:59 )    Color: Yellow / Appearance: Clear / S.025 / pH: x  Gluc: x / Ketone: 15 mg/dL  / Bili: Negative / Urobili: 0.2 mg/dL   Blood: x / Protein: Trace mg/dL / Nitrite: Negative   Leuk Esterase: Small / RBC: 4 /HPF / WBC 4 /HPF   Sq Epi: x / Non Sq Epi: 3 /HPF / Bacteria: Occasional /HPF      CULTURES:      Medications:  MEDICATIONS  (STANDING):  busPIRone 15 milliGRAM(s) Oral two times a day  donepezil 10 milliGRAM(s) Oral at bedtime  lidocaine   4% Patch 1 Patch Transdermal every 24 hours  memantine 10 milliGRAM(s) Oral two times a day  mirtazapine 30 milliGRAM(s) Oral at bedtime  oxybutynin 5 milliGRAM(s) Oral daily  sodium chloride 0.9%. 1000 milliLiter(s) (100 mL/Hr) IV Continuous <Continuous>    MEDICATIONS  (PRN):  acetaminophen   IVPB .. 1000 milliGRAM(s) IV Intermittent every 6 hours PRN Temp greater or equal to 38C (100.4F), Mild Pain (1 - 3)  morphine  - Injectable 2 milliGRAM(s) IV Push every 4 hours PRN Moderate Pain (4 - 6)  morphine  - Injectable 4 milliGRAM(s) IV Push every 4 hours PRN Severe Pain (7 - 10)  ondansetron Injectable 4 milliGRAM(s) IV Push every 6 hours PRN Nausea          RADIOLOGY/IMAGING/ECHO      < from: CT Chest w/ IV Cont (24 @ 21:21) >  IMPRESSION:  Motion degraded exam.  No main, lobar or segmental pulmonary embolism.  Age-indeterminate T7-T9 mildcompression fractures.      < end of copied text >  < from: CT Abdomen and Pelvis w/ IV Cont (24 @ 17:31) >  IMPRESSION:    Acute left acetabular comminuted fracture primarily involving the medial   and posterior compartments of the acetabulum. Fracture is noted at the   left superior pubic ramus-  acetabular junction as well. There is   superior displacement of the left femoral head.    Large left pelvic extraperitoneal hematoma involving the sciatic foramen   and left pelvic sidewall related to comminuted left pelvic acetabular   fracture. Vessel injury is difficult to exclude. For example superior   gluteal artery courses very close to one of the fracture fragments.   Streak artifact limits evaluation for active bleeding.    Also, there is mild T9 compression fracture, which appears acute.   Clinical correlation is recommended. Correlate with dedicated thoracic   spine imaging.    Dr. Brandt discussed these findings with Dr. Purnima Vazquez from ER on   2024 at 6:10 PM, with read back.          Assessment/Plan:    63F hx early dementia     Admit from [facility] with T 7-9 vertebral compression fx   L acetabular fx  L femoral head fx and Left sup pubic ramus fx  with ABLA Pelvic hematoma     There was no witnessed fall, however she had what was described as a generalized sz of new onset.    New Seizure > Neuro consult  Low dose benzo for now was taking at [facility] and stopped , likely representing benzo withdrawal sz  TIMUR from baseline> IVF for 12 hrs   Serial CBC

## 2024-08-16 NOTE — ED ADULT NURSE REASSESSMENT NOTE - NS ED NURSE REASSESS COMMENT FT1
Patient began to get agitated. Pt took off all EKG monitoring leads, O2 saturation and trying to take gown off. MD aware. Will medicate as per MD order. Patient began to get agitated. Pt took off all EKG monitoring leads, O2 saturation and trying to take gown off. Unable to obtain vital signs at this time MD aware. Will medicate as per MD order.

## 2024-08-16 NOTE — ED PROVIDER NOTE - PHYSICAL EXAMINATION
Constitutional: crying, anxious NAD AAOx1  Eyes: EOMI, PERRL  Head: Normocephalic atraumatic  Mouth: no airway obstruction, posterior oropharynx clear without erythema or exudate  Neck: supple  Cardiac: regular rate and rhythm, no MRG  Resp: Lungs CTAB  GI: Abd s/nt/nd, no cvat  Neuro: CN2-12 intact, strength 5/5x4, sensation grossly intact  Skin: No rashes  MSK: no midline tenderness of CTL spine

## 2024-08-16 NOTE — CONSULT NOTE ADULT - SUBJECTIVE AND OBJECTIVE BOX
Patient is a 63yFemale independent ambulator who presents to [facility] ED w/ a c/o of left hip pain. Patient is A&Ox1 and is unable to answer questions regarding her injury. Patient is able to follow commands. Sister is at bedside who is unaware of any trauma to the patient. Patient brought to ED with seizures. Sister states that patient is unable to walk due to the pain. Denies any numbness or tingling. No previous orthopedic history. No other orthopedic concerns at this time.    Dementia            No Known Allergies      PHYSICAL EXAM:  T(C): 37.2 (08-16-24 @ 16:06), Max: 37.2 (08-16-24 @ 16:06)  HR: 104 (08-16-24 @ 16:40) (97 - 122)  BP: 126/61 (08-16-24 @ 16:40) (115/96 - 129/70)  RR: 18 (08-16-24 @ 16:40) (18 - 20)  SpO2: 100% (08-16-24 @ 16:40) (98% - 100%)    Gen: NAD, Resting comfortably  LLE:  Skin intact, no erythema or ecchymosis  TTP around hip joint  Pain with log roll/axial loading  +EHL/FHL/TA/GSC  +SILT L3-S1  + DP  Compartments soft and compressible  No calf tenderness    Secondary Survey:   RLE/RUE/LUE: No TTP over bony prominences, SILT, palpable pulses, full/painless range of motion, compartments soft    Spine: No bony tenderness. No palpable stepoffs.    Imaging: L acetabulum fracture, personal read    A/P: 63F who presents with a L acetabulum fracture    Will elect for non-operative treatment 2/2 patient mental status and hx  Analgesia  Recommend IR consult for concern of pelvic bleed on CT Pelvis  T4 VCF care per neurosurgery spine  WBAT  DVT ppx per primary  PT/OT  Ice and elevate as tolerated  No acute orthopedic surgical intervention indicated at this time  Please FU with Dr. Oliva in office upon discharge  Discussed with Dr. Shen and Dr. Oliva who are in agreement with above plan   Patient is a 63yFemale independent ambulator who presents to [facility] ED w/ a c/o of left hip pain. Patient is A&Ox1 and is unable to answer questions regarding her injury. Patient is able to follow commands. Sister is at bedside who is unaware of any trauma to the patient. Patient brought to ED with seizures. Sister states that patient is unable to walk due to the pain. Denies any numbness or tingling. No previous orthopedic history. No other orthopedic concerns at this time.    Dementia            No Known Allergies      PHYSICAL EXAM:  T(C): 37.2 (08-16-24 @ 16:06), Max: 37.2 (08-16-24 @ 16:06)  HR: 104 (08-16-24 @ 16:40) (97 - 122)  BP: 126/61 (08-16-24 @ 16:40) (115/96 - 129/70)  RR: 18 (08-16-24 @ 16:40) (18 - 20)  SpO2: 100% (08-16-24 @ 16:40) (98% - 100%)    Gen: NAD, Resting comfortably  LLE:  Skin intact, no erythema or ecchymosis  TTP around hip joint  Pain with log roll/axial loading  +EHL/FHL/TA/GSC  +SILT L3-S1  + DP  Compartments soft and compressible  No calf tenderness    Secondary Survey:   RLE/RUE/LUE: No TTP over bony prominences, SILT, palpable pulses, full/painless range of motion, compartments soft    Spine: No bony tenderness. No palpable stepoffs.    Imaging: L acetabulum fracture, personal read    A/P: 63F who presents with a L acetabulum fracture    Will elect for non-operative treatment 2/2 patient mental status and hx  Analgesia  Recommend IR consult for concern of pelvic bleed on CT Pelvis  T9 VCF care per neurosurgery spine  WBAT  DVT ppx per primary  PT/OT  Ice and elevate as tolerated  No acute orthopedic surgical intervention indicated at this time  Please FU with Dr. Oliva in office upon discharge  Discussed with Dr. Shen and Dr. Oliva who are in agreement with above plan

## 2024-08-16 NOTE — ED ADULT NURSE NOTE - OBJECTIVE STATEMENT
Pt is a 64 y/o female c/o seizures. As per triage now, "BIBA to ED from [facility] for witnessed seizure by staff lasting about 2-3 min. PT hx of dementia, a&ox2 in triage. EMS reported pt was having hip pain PT states "my shoulder hurts". No reports of a fall. PT keeps repeating "I want to go home". EMS reports PT recently had medications adjusted." Pt is tearful and agitated upon assessment. Pt complaining of hip pain, unknown which hip, and states she wants to go home.

## 2024-08-16 NOTE — CONSULT NOTE ADULT - ATTENDING COMMENTS
Patient seen and examined in ICU with 1:1 at bedside. Patient resting in NAD. No family at bedside. Relevant imaging reviewed. H&P reviewed and note above adjusted where necessary. No acute intervention at this time. Patient's mental status and compliance concerns lead towards a conservative approach. May require orthopedic surgery in the future depending on healing within the hip and potential arthritis. Possible REYNALDO in the future.

## 2024-08-16 NOTE — ED ADULT NURSE REASSESSMENT NOTE - NS ED NURSE REASSESS COMMENT FT1
Pt had a seizure at this time. Pt placed on her side and brought into a room. MD Vazquez made aware and is at bedside. Verbal order of 2mg of ativan IV to be given. Pt placed on 15L on a nonrebreather.

## 2024-08-16 NOTE — ED ADULT NURSE NOTE - NSFALLRISKINTERV_ED_ALL_ED
Assistance OOB with selected safe patient handling equipment if applicable/Communicate fall risk and risk factors to all staff, patient, and family/Monitor for mental status changes and reorient to person, place, and time, as needed/Move patient closer to nursing station/within visual sight of ED staff/Provide visual cue: yellow wristband, yellow gown, etc/Reinforce activity limits and safety measures with patient and family/Toileting schedule using arm’s reach rule for commode and bathroom/Use of alarms - bed, stretcher, chair and/or video monitoring/Call bell, personal items and telephone in reach/Instruct patient to call for assistance before getting out of bed/chair/stretcher/Non-slip footwear applied when patient is off stretcher/[place] to call system/Physically safe environment - no spills, clutter or unnecessary equipment/Purposeful Proactive Rounding/Room/bathroom lighting operational, light cord in reach

## 2024-08-16 NOTE — ED ADULT TRIAGE NOTE - CHIEF COMPLAINT QUOTE
BIBA to ED from [facility] for witnessed seizure by staff lasting about 2-3 min. PT hx of dementia, a&ox2 in triage. EMS reported pt was having hip pain PT states "my shoulder hurts". No reports of a fall. PT keeps repeating "I want to go home". EMS reports PT recently had medications adjusted.

## 2024-08-16 NOTE — ED PROVIDER NOTE - CLINICAL SUMMARY MEDICAL DECISION MAKING FREE TEXT BOX
Presentation most concerning for benzodiazepine withdrawal seizure.  Will proceed with EKG, chest x-ray, labs, CT brain, UA, IV hydration, Ativan, monitor and reassessment Presentation most concerning for benzodiazepine withdrawal seizure.  Will proceed with EKG, chest x-ray, labs, CT brain, UA, IV hydration, Ativan, monitor and reassessment. CT imaging shows left acetabular fracture, I Dr. Vazquez ED attending spoke with trauma PA who will see and evaluate pt at bedside Presentation most concerning for benzodiazepine withdrawal seizure.  Will proceed with EKG, chest x-ray, labs, CT brain, UA, IV hydration, Ativan, monitor and reassessment. CT imaging shows left acetabular fracture, I Dr. Vazquze ED attending spoke with trauma PA who will see and evaluate pt at bedside.  Pt evaluated by orthopedic surgery, recommends no acute surgical intervention. Labs show white count of 16, H/H within normal limits, UA negative.  Patient admitted to Dr. Nath trauma attending for left acetabular fracture with hematoma

## 2024-08-16 NOTE — ED PROVIDER NOTE - OBJECTIVE STATEMENT
63yoF PMH dementia, overactive bladder presents from [facility] with complaint of b/l hip and lower back pain. Pt AAOX1 only, anxious and crying, not ab unable to tell me why she is here. Pt then had witnessed tonic clonic seizure at approximately 2:45pm, was given Ativan 2mg IV.     Per Tierra gutiérrez's nurse pt was complaining of hip and back pain since this morning, had a witnessed seizure at lunch approximately 45 seconds. Pt did not fall, was lowered to floor. Pt was taking Ativan for anxiety last dose 9pm on 8/13/24  bc her psychiatrist was away so the primary doctor gave her vistaril instead for anxiety. Nurse concerned this was likely benzo w/d seizure, no hx of seizure.  Nurse states she at baseline is AAOx1, is usually crying but can answer simple questions

## 2024-08-17 DIAGNOSIS — T14.8XXA OTHER INJURY OF UNSPECIFIED BODY REGION, INITIAL ENCOUNTER: ICD-10-CM

## 2024-08-17 DIAGNOSIS — M25.512 PAIN IN LEFT SHOULDER: ICD-10-CM

## 2024-08-17 DIAGNOSIS — S32.9XXA FRACTURE OF UNSPECIFIED PARTS OF LUMBOSACRAL SPINE AND PELVIS, INITIAL ENCOUNTER FOR CLOSED FRACTURE: ICD-10-CM

## 2024-08-17 DIAGNOSIS — S22.000A WEDGE COMPRESSION FRACTURE OF UNSPECIFIED THORACIC VERTEBRA, INITIAL ENCOUNTER FOR CLOSED FRACTURE: ICD-10-CM

## 2024-08-17 LAB
ALBUMIN SERPL ELPH-MCNC: 2.9 G/DL — LOW (ref 3.3–5)
ALP SERPL-CCNC: 80 U/L — SIGNIFICANT CHANGE UP (ref 40–120)
ALT FLD-CCNC: 33 U/L — SIGNIFICANT CHANGE UP (ref 12–78)
ANION GAP SERPL CALC-SCNC: 6 MMOL/L — SIGNIFICANT CHANGE UP (ref 5–17)
AST SERPL-CCNC: 36 U/L — SIGNIFICANT CHANGE UP (ref 15–37)
BILIRUB SERPL-MCNC: 0.8 MG/DL — SIGNIFICANT CHANGE UP (ref 0.2–1.2)
BUN SERPL-MCNC: 20 MG/DL — SIGNIFICANT CHANGE UP (ref 7–23)
CALCIUM SERPL-MCNC: 8.3 MG/DL — LOW (ref 8.5–10.1)
CHLORIDE SERPL-SCNC: 111 MMOL/L — HIGH (ref 96–108)
CO2 SERPL-SCNC: 23 MMOL/L — SIGNIFICANT CHANGE UP (ref 22–31)
CREAT SERPL-MCNC: 1 MG/DL — SIGNIFICANT CHANGE UP (ref 0.5–1.3)
EGFR: 64 ML/MIN/1.73M2 — SIGNIFICANT CHANGE UP
GLUCOSE SERPL-MCNC: 142 MG/DL — HIGH (ref 70–99)
HCT VFR BLD CALC: 36.5 % — SIGNIFICANT CHANGE UP (ref 34.5–45)
HCT VFR BLD CALC: 38.8 % — SIGNIFICANT CHANGE UP (ref 34.5–45)
HCT VFR BLD CALC: 39.3 % — SIGNIFICANT CHANGE UP (ref 34.5–45)
HGB BLD-MCNC: 11.9 G/DL — SIGNIFICANT CHANGE UP (ref 11.5–15.5)
HGB BLD-MCNC: 12.7 G/DL — SIGNIFICANT CHANGE UP (ref 11.5–15.5)
HGB BLD-MCNC: 13.2 G/DL — SIGNIFICANT CHANGE UP (ref 11.5–15.5)
MAGNESIUM SERPL-MCNC: 2.3 MG/DL — SIGNIFICANT CHANGE UP (ref 1.6–2.6)
MCHC RBC-ENTMCNC: 32.2 PG — SIGNIFICANT CHANGE UP (ref 27–34)
MCHC RBC-ENTMCNC: 32.2 PG — SIGNIFICANT CHANGE UP (ref 27–34)
MCHC RBC-ENTMCNC: 32.3 PG — SIGNIFICANT CHANGE UP (ref 27–34)
MCHC RBC-ENTMCNC: 32.6 GM/DL — SIGNIFICANT CHANGE UP (ref 32–36)
MCHC RBC-ENTMCNC: 32.7 GM/DL — SIGNIFICANT CHANGE UP (ref 32–36)
MCHC RBC-ENTMCNC: 33.6 GM/DL — SIGNIFICANT CHANGE UP (ref 32–36)
MCV RBC AUTO: 95.9 FL — SIGNIFICANT CHANGE UP (ref 80–100)
MCV RBC AUTO: 98.6 FL — SIGNIFICANT CHANGE UP (ref 80–100)
MCV RBC AUTO: 98.7 FL — SIGNIFICANT CHANGE UP (ref 80–100)
PHOSPHATE SERPL-MCNC: 4.1 MG/DL — SIGNIFICANT CHANGE UP (ref 2.5–4.5)
PLATELET # BLD AUTO: 170 K/UL — SIGNIFICANT CHANGE UP (ref 150–400)
PLATELET # BLD AUTO: 189 K/UL — SIGNIFICANT CHANGE UP (ref 150–400)
PLATELET # BLD AUTO: 226 K/UL — SIGNIFICANT CHANGE UP (ref 150–400)
POTASSIUM SERPL-MCNC: 3.7 MMOL/L — SIGNIFICANT CHANGE UP (ref 3.5–5.3)
POTASSIUM SERPL-SCNC: 3.7 MMOL/L — SIGNIFICANT CHANGE UP (ref 3.5–5.3)
PROCALCITONIN SERPL-MCNC: 0.22 NG/ML — HIGH (ref 0.02–0.1)
PROT SERPL-MCNC: 5.7 GM/DL — LOW (ref 6–8.3)
RBC # BLD: 3.7 M/UL — LOW (ref 3.8–5.2)
RBC # BLD: 3.93 M/UL — SIGNIFICANT CHANGE UP (ref 3.8–5.2)
RBC # BLD: 4.1 M/UL — SIGNIFICANT CHANGE UP (ref 3.8–5.2)
RBC # FLD: 13.2 % — SIGNIFICANT CHANGE UP (ref 10.3–14.5)
RBC # FLD: 13.5 % — SIGNIFICANT CHANGE UP (ref 10.3–14.5)
RBC # FLD: 13.7 % — SIGNIFICANT CHANGE UP (ref 10.3–14.5)
SODIUM SERPL-SCNC: 140 MMOL/L — SIGNIFICANT CHANGE UP (ref 135–145)
WBC # BLD: 10.18 K/UL — SIGNIFICANT CHANGE UP (ref 3.8–10.5)
WBC # BLD: 7.86 K/UL — SIGNIFICANT CHANGE UP (ref 3.8–10.5)
WBC # BLD: 8.65 K/UL — SIGNIFICANT CHANGE UP (ref 3.8–10.5)
WBC # FLD AUTO: 10.18 K/UL — SIGNIFICANT CHANGE UP (ref 3.8–10.5)
WBC # FLD AUTO: 7.86 K/UL — SIGNIFICANT CHANGE UP (ref 3.8–10.5)
WBC # FLD AUTO: 8.65 K/UL — SIGNIFICANT CHANGE UP (ref 3.8–10.5)

## 2024-08-17 PROCEDURE — 93010 ELECTROCARDIOGRAM REPORT: CPT

## 2024-08-17 PROCEDURE — 99291 CRITICAL CARE FIRST HOUR: CPT

## 2024-08-17 PROCEDURE — 73030 X-RAY EXAM OF SHOULDER: CPT | Mod: 26,LT

## 2024-08-17 PROCEDURE — 99223 1ST HOSP IP/OBS HIGH 75: CPT

## 2024-08-17 RX ORDER — LORAZEPAM 4 MG/ML
0.5 INJECTION INTRAMUSCULAR; INTRAVENOUS EVERY 4 HOURS
Refills: 0 | Status: DISCONTINUED | OUTPATIENT
Start: 2024-08-17 | End: 2024-08-19

## 2024-08-17 RX ORDER — PIPERACILLIN SODIUM AND TAZOBACTAM SODIUM 3; .375 G/15ML; G/15ML
3.38 INJECTION, POWDER, FOR SOLUTION INTRAVENOUS ONCE
Refills: 0 | Status: COMPLETED | OUTPATIENT
Start: 2024-08-17 | End: 2024-08-18

## 2024-08-17 RX ORDER — ACETAMINOPHEN 325 MG/1
1000 TABLET ORAL EVERY 6 HOURS
Refills: 0 | Status: COMPLETED | OUTPATIENT
Start: 2024-08-17 | End: 2024-08-17

## 2024-08-17 RX ORDER — ACETAMINOPHEN 325 MG/1
1000 TABLET ORAL ONCE
Refills: 0 | Status: COMPLETED | OUTPATIENT
Start: 2024-08-17 | End: 2024-08-17

## 2024-08-17 RX ORDER — PIPERACILLIN SODIUM AND TAZOBACTAM SODIUM 3; .375 G/15ML; G/15ML
3.38 INJECTION, POWDER, FOR SOLUTION INTRAVENOUS ONCE
Refills: 0 | Status: COMPLETED | OUTPATIENT
Start: 2024-08-18 | End: 2024-08-18

## 2024-08-17 RX ORDER — PIPERACILLIN SODIUM AND TAZOBACTAM SODIUM 3; .375 G/15ML; G/15ML
3.38 INJECTION, POWDER, FOR SOLUTION INTRAVENOUS ONCE
Refills: 0 | Status: COMPLETED | OUTPATIENT
Start: 2024-08-17 | End: 2024-08-17

## 2024-08-17 RX ORDER — LORAZEPAM 4 MG/ML
0.5 INJECTION INTRAMUSCULAR; INTRAVENOUS THREE TIMES A DAY
Refills: 0 | Status: DISCONTINUED | OUTPATIENT
Start: 2024-08-17 | End: 2024-08-17

## 2024-08-17 RX ORDER — PIPERACILLIN SODIUM AND TAZOBACTAM SODIUM 3; .375 G/15ML; G/15ML
3.38 INJECTION, POWDER, FOR SOLUTION INTRAVENOUS EVERY 8 HOURS
Refills: 0 | Status: DISCONTINUED | OUTPATIENT
Start: 2024-08-18 | End: 2024-08-20

## 2024-08-17 RX ADMIN — Medication 1 PATCH: at 10:50

## 2024-08-17 RX ADMIN — DONEPEZIL HYDROCHLORIDE 10 MILLIGRAM(S): 5 TABLET, FILM COATED ORAL at 22:01

## 2024-08-17 RX ADMIN — Medication 1 PATCH: at 19:44

## 2024-08-17 RX ADMIN — SODIUM CHLORIDE 100 MILLILITER(S): 9 INJECTION INTRAMUSCULAR; INTRAVENOUS; SUBCUTANEOUS at 01:11

## 2024-08-17 RX ADMIN — SODIUM CHLORIDE 100 MILLILITER(S): 9 INJECTION INTRAMUSCULAR; INTRAVENOUS; SUBCUTANEOUS at 11:35

## 2024-08-17 RX ADMIN — MEMANTINE 10 MILLIGRAM(S): 7 CAPSULE, EXTENDED RELEASE ORAL at 10:50

## 2024-08-17 RX ADMIN — LORAZEPAM 0.5 MILLIGRAM(S): 4 INJECTION INTRAMUSCULAR; INTRAVENOUS at 08:10

## 2024-08-17 RX ADMIN — MEMANTINE 10 MILLIGRAM(S): 7 CAPSULE, EXTENDED RELEASE ORAL at 22:01

## 2024-08-17 RX ADMIN — ACETAMINOPHEN 400 MILLIGRAM(S): 325 TABLET ORAL at 01:13

## 2024-08-17 RX ADMIN — Medication 30 MILLIGRAM(S): at 22:01

## 2024-08-17 RX ADMIN — PIPERACILLIN SODIUM AND TAZOBACTAM SODIUM 200 GRAM(S): 3; .375 INJECTION, POWDER, FOR SOLUTION INTRAVENOUS at 20:09

## 2024-08-17 RX ADMIN — Medication 1 PATCH: at 21:58

## 2024-08-17 RX ADMIN — OXYBUTYNIN CHLORIDE 5 MILLIGRAM(S): 5 TABLET ORAL at 10:47

## 2024-08-17 RX ADMIN — BUSPIRONE HYDROCHLORIDE 15 MILLIGRAM(S): 30 TABLET ORAL at 10:48

## 2024-08-17 RX ADMIN — Medication 4 MILLIGRAM(S): at 01:11

## 2024-08-17 RX ADMIN — ACETAMINOPHEN 400 MILLIGRAM(S): 325 TABLET ORAL at 18:48

## 2024-08-17 RX ADMIN — Medication 4 MILLIGRAM(S): at 01:41

## 2024-08-17 RX ADMIN — LORAZEPAM 0.5 MILLIGRAM(S): 4 INJECTION INTRAMUSCULAR; INTRAVENOUS at 18:55

## 2024-08-17 RX ADMIN — BUSPIRONE HYDROCHLORIDE 15 MILLIGRAM(S): 30 TABLET ORAL at 22:00

## 2024-08-17 RX ADMIN — ACETAMINOPHEN 1000 MILLIGRAM(S): 325 TABLET ORAL at 01:43

## 2024-08-17 NOTE — PATIENT PROFILE ADULT - FALL HARM RISK - HARM RISK INTERVENTIONS
Assistance with ambulation/Assistance OOB with selected safe patient handling equipment/Communicate Risk of Fall with Harm to all staff/Monitor for mental status changes/Move patient closer to nurses' station/Reinforce activity limits and safety measures with patient and family/Reorient to person, place and time as needed/Tailored Fall Risk Interventions/Toileting schedule using arm’s reach rule for commode and bathroom/Use of alarms - bed, chair and/or voice tab/Visual Cue: Yellow wristband and red socks/Bed in lowest position, wheels locked, appropriate side rails in place/Call bell, personal items and telephone in reach/Instruct patient to call for assistance before getting out of bed or chair/Non-slip footwear when patient is out of bed/[place] to call system/Physically safe environment - no spills, clutter or unnecessary equipment/Purposeful Proactive Rounding/Room/bathroom lighting operational, light cord in reach

## 2024-08-17 NOTE — H&P ADULT - HISTORY OF PRESENT ILLNESS
63F PMH early dementia, overactive bladder, presents from [facility] who complaint of b/l hip and lower back pain. Pt AAOX1, anxious and crying, not able to tell me why she is here. Pt had witnessed tonic clonic seizure in ED at approximately 2:45pm, was given Ativan 2mg IV. Per pt's nurse at [facility] [[email or web address]], pt was complaining of hip and back pain since this morning, had a witnessed seizure at lunch approximately 45 seconds. Pt did not fall, was lowered to floor. Pt was taking Ativan for anxiety last dose 9pm on 8/13/24  because her psychiatrist was away so the primary doctor gave her vistaril instead for anxiety. Nurse concerned this was likely benzo w/d seizure, no hx of seizures.  Nurse states she at baseline is AAOx1, is usually crying but can answer simple questions

## 2024-08-17 NOTE — PHYSICAL THERAPY INITIAL EVALUATION ADULT - PERTINENT HX OF CURRENT PROBLEM, REHAB EVAL
presents from [facility] who complaint of b/l hip and lower back pain. had witnessed tonic clonic seizure in ED at approximately 2:45pm, was given Ativan 2mg IV. Per pt's nurse at [facility], pt was complaining of hip and back pain since this morning, had a witnessed seizure at lunch approximately 45 seconds. Pt did not fall, was lowered to floor. Pt was taking Ativan for anxiety last dose 9pm on 8/13/24  because her psychiatrist was away so the primary doctor gave her vistaril instead for anxiety. concern this was likely benzo w/d seizure. CT A/P- Ac L acetabular comminuted fx primarily involving the medial   and posterior compartments of the acetabulum. Fx is noted at the L superior pubic ramus-  acetabular junction as well. There is superior displacement of the left femoral head. Large left pelvic extraperitoneal hematoma involving the sciatic foramen and left pelvic sidewall related to comminuted left pelvic acetabular fracture. Vessel injury is difficult to exclude. For ex. superior gluteal artery courses very close to one of the fracture fragments. mild T9 compression fracture, which appears acute. CT chest-Age-indeterminate T7-T9 mild compression fractures presents from [facility] who complaint of b/l hip and lower back pain. had witnessed tonic clonic seizure in ED at approximately 2:45pm, was given Ativan 2mg IV. Per pt's nurse at [facility], pt was complaining of hip and back pain since this morning, had a witnessed seizure at lunch approximately 45 seconds. Pt did not fall, was lowered to floor. Pt was taking Ativan for anxiety last dose 9pm on 8/13/24  because her psychiatrist was away so the primary doctor gave her vistaril instead for anxiety. concern this was likely benzo w/d seizure. CT A/P- Ac L acetabular comminuted fx primarily involving the medial   and posterior compartments of the acetabulum. Fx is noted at the L superior pubic ramus-  acetabular junction as well. There is superior displacement of the left femoral head. Large left pelvic extraperitoneal hematoma involving the sciatic foramen and left pelvic sidewall related to comminuted left pelvic acetabular fracture. Vessel injury is difficult to exclude. For ex. superior gluteal artery courses very close to one of the fracture fragments. mild T9 compression fracture, which appears acute. CT chest-Age-indeterminate T7-T9 mild compression fractures.

## 2024-08-17 NOTE — PROGRESS NOTE ADULT - SUBJECTIVE AND OBJECTIVE BOX
63F PMH early dementia, overactive bladder, presents from [facility] who complaint of b/l hip and lower back pain. Pt AAOX1, anxious and crying, not able to tell me why she is here. Pt had witnessed tonic clonic seizure in ED at approximately 2:45pm, was given Ativan 2mg IV. Per pt's nurse at [facility], pt was complaining of hip and back pain since this morning, had a witnessed seizure at lunch approximately 45 seconds. Pt did not fall, was lowered to floor. Pt was taking Ativan for anxiety last dose 9pm on 8/13/24  because her psychiatrist was away so the primary doctor gave her vistaril instead for anxiety. Nurse concerned this was likely benzo w/d seizure, no hx of seizures.  Nurse states she at baseline is AAOx1.  8/17 Clinically stable, case discussed on SICU rounds and with the family.    ICU Vital Signs Last 24 Hrs  T(C): 36.6 (17 Aug 2024 05:00), Max: 38.5 (17 Aug 2024 01:06)  T(F): 97.9 (17 Aug 2024 05:00), Max: 101.3 (17 Aug 2024 01:06)  HR: 111 (17 Aug 2024 12:00) (89 - 122)  BP: 115/56 (17 Aug 2024 12:00) (91/43 - 145/115)  BP(mean): 72 (17 Aug 2024 12:00) (58 - 119)  RR: 19 (17 Aug 2024 12:00) (14 - 21)  SpO2: 100% (17 Aug 2024 12:00) (97% - 100%)    O2 Parameters below as of 17 Aug 2024 06:00  Patient On (Oxygen Delivery Method): room air                                12.7   7.86  )-----------( 170      ( 17 Aug 2024 05:32 )             38.8       08-17    140  |  111<H>  |  20  ----------------------------<  142<H>  3.7   |  23  |  1.00    Ca    8.3<L>      17 Aug 2024 05:32  Phos  4.1     08-17  Mg     2.3     08-17    TPro  5.7<L>  /  Alb  2.9<L>  /  TBili  0.8  /  DBili  x   /  AST  36  /  ALT  33  /  AlkPhos  80  08-17    LIVER FUNCTIONS - ( 17 Aug 2024 05:32 )  Alb: 2.9 g/dL / Pro: 5.7 gm/dL / ALK PHOS: 80 U/L / ALT: 33 U/L / AST: 36 U/L / GGT: x

## 2024-08-17 NOTE — PHYSICAL THERAPY INITIAL EVALUATION ADULT - MODALITIES TREATMENT COMMENTS
pt left supine in bed, BP soft, H/H ok. deferred oob /mobility pending clarification if ortho spine cx indicated - inquired w/ trauma service- no spine consult/intervention needed,  as well as re: apparent pain w/ L shld ROM- XR ordered. confirmed NWB LUE noted w/in PT order was typo but would await XR results. pt in NAD, supine, 1:1 , gray, monitors, CP applied L hip/pelvis, O2 pt left supine in bed, BP soft, H/H ok. deferred oob /mobility pending clarification if ortho spine cx indicated - inquired w/ trauma service- no spine consult/intervention needed,  as well as re: apparent pain w/ L shld ROM- XR ordered. confirmed w/ ortho and trauma "NWB LUE" noted w/in PT order was typo but would await XR results. pt in NAD, supine, 1:1 , gray, monitors, CP applied L hip/pelvis, O2

## 2024-08-17 NOTE — H&P ADULT - NSHPPHYSICALEXAM_GEN_ALL_CORE
GCS of 15  Airway is patent  Breathing is symmetric and unlabored  Neuro: CNII-XII grossly intact  Psych: normal affect  HEENT: Normocephalic, atraumatic, GENEVIEVE, EOM wnl, no otorrhea or hemotympanum b/l, no epistaxis or d/c b/l nares, no craniofacial bony pathology or tenderness b/l  Neck: Soft and supple, nontender to passive/active ROM exam. No crepitus, no ecchymosis, no hematoma, to exam, no JVD, no tracheal deviation  Cspine/thoracolumbrosacral spine: no gross bony pathology or tenderness to exam  Cardiovascular: S1S2 Present  Chest: no ecchymosis or tenderness to palpation. No sternal pathology or tenderness to exam. No crepitus, no ecchymosis, no hematoma. No penetrating thorcoabdominal trauma  Respiratory: Rate is 18; Respiratory Effort normal; no wheezes, rales or rhonchi to exam  ABD: bowel sounds (+), soft, nontender, non distended, no rebound, no guarding, no rigidity, no skin changes to exam. No pelvic instability to exam, no skin changes  Musculoskeletal: Pt has palpable b/l radial, femoral, dorsalis pedis pulses. All digits are warm and well perfused. No gross long bone pathology or tenderness to exam. Pt demonstrates grossly intact sensoromotor function. Pt has good capillary refill to digits, no calf edema or tenderness to exam. Tenderness to Left Hip, no overlying ecchymosis or hematoma.  Skin: no lesions or rashes to exam

## 2024-08-17 NOTE — H&P ADULT - ASSESSMENT
63F with hx early dementia admitted from [facility] with T 7-9 vertebral compression fx , L acetabular fx,  L femoral head fx, and Left sup pubic ramus fx with pelvic hematoma   no witnessed fall, pt woke up yest AM with left hip pain as per JAMIE.    admit to trauma surgery, Dr. Gonsalez  Multimodal pain control  Incentive spirometry  q6hr cbc  Vitals, IS/OS Q 4  Diet:   ( X) as tolerated ( ) NPO  chemical dvt ppx on hold due to bleeding risk  ortho recs appreciated--deferring surgery at this time due to mental status  medical service recs appreciated  neuro recs appreciated  Activity:  WBAT  PT in am   Resume home meds  SW for eventual D/C planning  Neuro consult  for new onset seizures --Low dose benzo for now was taking at [facility] and stopped 8/13, likely representing benzo withdrawal sz  TIMUR from baseline> IVF for 12 hrs

## 2024-08-17 NOTE — PHYSICAL THERAPY INITIAL EVALUATION ADULT - GENERAL OBSERVATIONS, REHAB EVAL
pt rec'd supine in bed on SDU, O2, IV, monitors, gray, L hip IR posturing, 1:1, sleeping, aroused w/ verbal/tactile stim but remained somnolent

## 2024-08-17 NOTE — PHYSICAL THERAPY INITIAL EVALUATION ADULT - PASSIVE RANGE OF MOTION EXAMINATION, REHAB EVAL
except L hip/knee NT, L pt resistive to PROM L shld at times - at least 90, reports "a little" pain L shld w/ PROM but definitely resistive vs RUE easily performed ROM/no Passive ROM deficits were identified

## 2024-08-17 NOTE — EEG REPORT - NS EEG TEXT BOX
AURELIO BHAKTA [id number]     Study Date: 		08-17-24  Duration: 20 min    --------------------------------------------------------------------------------------------------  History:  CC/ HPI Patient is a 63y old  Female who presents with a chief complaint of Fall (17 Aug 2024 13:19)    MEDICATIONS  (STANDING):  busPIRone 15 milliGRAM(s) Oral two times a day  donepezil 10 milliGRAM(s) Oral at bedtime  lidocaine   4% Patch 1 Patch Transdermal every 24 hours  memantine 10 milliGRAM(s) Oral two times a day  mirtazapine 30 milliGRAM(s) Oral at bedtime  oxybutynin 5 milliGRAM(s) Oral daily  sodium chloride 0.9%. 1000 milliLiter(s) (100 mL/Hr) IV Continuous <Continuous>    --------------------------------------------------------------------------------------------------  Study Interpretation:    [Abbreviation Key:  PDR=alpha rhythm/posterior dominant rhythm. A-P=anterior posterior.  Amplitude: ‘very low’:<20; ‘low’:20-49; ‘medium’:; ‘high’:>150uV.  Persistence for periodic/rhythmic patterns (% of epoch) ‘rare’:<1%; ‘occasional’:1-10%; ‘frequent’:10-50%; ‘abundant’:50-90%; ‘continuous’:>90%.  Persistence for sporadic discharges: ‘rare’:<1/hr; ‘occasional’:1/min-1/hr; ‘frequent’:>1/min; ‘abundant’:>1/10 sec.  RPP=rhythmic and periodic patterns; GRDA=generalized rhythmic delta activity; FIRDA=frontal intermittent GRDA; LRDA=lateralized rhythmic delta activity; TIRDA=temporal intermittent rhythmic delta activity;  LPD=PLED=lateralized periodic discharges; GPD=generalized periodic discharges; BIPDs =bilateral independent periodic discharges; Mf=multifocal; SIRPDs=stimulus induced rhythmic, periodic, or ictal appearing discharges; BIRDs=brief potentially ictal rhythmic discharges >4 Hz, lasting .5-10s; PFA (paroxysmal bursts >13 Hz or =8 Hz <10s).  Modifiers: +F=with fast component; +S=with spike component; +R=with rhythmic component.  S-B=burst suppression pattern.  Max=maximal. N1-drowsy; N2-stage II sleep; N3-slow wave sleep. SSS/BETS=small sharp spikes/benign epileptiform transients of sleep. HV=hyperventilation; PS=photic stimulation]    FINDINGS:      Background:  Continuity: continuous  Symmetry: asymmetric  PDR: Absent  Reactivity: present  Voltage: normal (between 20-150uV)  Anterior Posterior Gradient: present  Other background findings: none  Breach: absent    Background Slowing:  Generalized slowing: diffuse irregular delta and theta activity.  Focal slowing: Intermittent left temporal max delta activity.     State Changes:   -N2 sleep transients were not recorded.    Sporadic Epileptiform Discharges:    None    Rhythmic and Periodic Patterns (RPPs):  None     Electrographic and Electroclinical seizures:  None    Other Clinical Events:  None    Activation Procedures:   -Hyperventilation was not performed.      -Photic stimulation was performed and did not elicit any abnormalities.       Artifacts:  Intermittent myogenic and movement artifacts were noted.    ECG:  The heart rate on single channel ECG was predominantly between 75-85 BPM.    EEG Classification / Summary:  Abnormal EEG study  Intermittent left temporal max delta activity.   Moderate generalized background slowing    -----------------------------------------------------------------------------------------------------    Clinical Impression:  Focal cerebral dysfunction seen in the left temporal region.  Moderate diffuse/multi-focal cerebral dysfunction, not specific as to etiology.  There were no epileptiform abnormalities recorded.      This is a preliminary fellow impression only pending attending review    Yan Elder MD - Epilepsy Fellow      AURELIO BHAKTA [id number]     Study Date: 		08-17-24  Duration: 20 min    --------------------------------------------------------------------------------------------------  History:  CC/ HPI Patient is a 63y old  Female who presents with a chief complaint of Fall (17 Aug 2024 13:19)    MEDICATIONS  (STANDING):  busPIRone 15 milliGRAM(s) Oral two times a day  donepezil 10 milliGRAM(s) Oral at bedtime  lidocaine   4% Patch 1 Patch Transdermal every 24 hours  memantine 10 milliGRAM(s) Oral two times a day  mirtazapine 30 milliGRAM(s) Oral at bedtime  oxybutynin 5 milliGRAM(s) Oral daily  sodium chloride 0.9%. 1000 milliLiter(s) (100 mL/Hr) IV Continuous <Continuous>    --------------------------------------------------------------------------------------------------  Study Interpretation:    [Abbreviation Key:  PDR=alpha rhythm/posterior dominant rhythm. A-P=anterior posterior.  Amplitude: ‘very low’:<20; ‘low’:20-49; ‘medium’:; ‘high’:>150uV.  Persistence for periodic/rhythmic patterns (% of epoch) ‘rare’:<1%; ‘occasional’:1-10%; ‘frequent’:10-50%; ‘abundant’:50-90%; ‘continuous’:>90%.  Persistence for sporadic discharges: ‘rare’:<1/hr; ‘occasional’:1/min-1/hr; ‘frequent’:>1/min; ‘abundant’:>1/10 sec.  RPP=rhythmic and periodic patterns; GRDA=generalized rhythmic delta activity; FIRDA=frontal intermittent GRDA; LRDA=lateralized rhythmic delta activity; TIRDA=temporal intermittent rhythmic delta activity;  LPD=PLED=lateralized periodic discharges; GPD=generalized periodic discharges; BIPDs =bilateral independent periodic discharges; Mf=multifocal; SIRPDs=stimulus induced rhythmic, periodic, or ictal appearing discharges; BIRDs=brief potentially ictal rhythmic discharges >4 Hz, lasting .5-10s; PFA (paroxysmal bursts >13 Hz or =8 Hz <10s).  Modifiers: +F=with fast component; +S=with spike component; +R=with rhythmic component.  S-B=burst suppression pattern.  Max=maximal. N1-drowsy; N2-stage II sleep; N3-slow wave sleep. SSS/BETS=small sharp spikes/benign epileptiform transients of sleep. HV=hyperventilation; PS=photic stimulation]    FINDINGS:      Background:  Continuity: continuous  Symmetry: asymmetric  PDR: Absent  Reactivity: present  Voltage: normal (between 20-150uV)  Anterior Posterior Gradient: none  Other background findings: none  Breach: absent    Background Slowing:  Generalized slowing: diffuse irregular delta > theta activity.      State Changes:   -N2 sleep transients were not recorded.    Sporadic Epileptiform Discharges:    None    Rhythmic and Periodic Patterns (RPPs):  None     Electrographic and Electroclinical seizures:  None    Other Clinical Events:  None    Activation Procedures:   -Hyperventilation was not performed.      -Photic stimulation was performed and did not elicit any abnormalities.       Artifacts:  Intermittent myogenic and movement artifacts were noted.    ECG:  The heart rate on single channel ECG was predominantly between 75-85 BPM.    EEG Classification / Summary:  Abnormal EEG study  Moderate to severe generalized background slowing    -----------------------------------------------------------------------------------------------------    Clinical Impression:  Moderate to severe diffuse/multi-focal cerebral dysfunction, not specific as to etiology.  There were no epileptiform abnormalities recorded.        Yan Elder MD - Epilepsy Fellow     MD KUNAL Cardozo  Director, Continuous EEG Monitoring Program, [facility]   and Epilepsy Fellowship ,   Department of Neurology, [employer]    [facility] EEG Reading Room Ph#: [phone]  Epilepsy Answering Service after 5PM and before 8:30AM: Ph#: [phone]

## 2024-08-17 NOTE — PATIENT PROFILE ADULT - NS PRO AD ANY ON CHART
----- Message from Cecelia Avila sent at 2/9/2022 11:18 AM CST -----  Regarding: refill  Contact: Patient/[phone] (home)  Type:  RX Refill Request    Who Called:  Patient/[phone] (home)     Refill or New Rx:  refill  RX Name and Strength:  HYDROcodone-acetaminophen (NORCO) 5-325 mg per tablet      How is the patient currently taking it? (ex. 1XDay):  4xday  Is this a 30 day or 90 day RX:  120 pills  Preferred Pharmacy with phone number:    [id number] [place] [address] AT [facility] [address] [facility] [address] [address]  Phone: [phone] Fax: [phone]      Local or Mail Order:  local  Ordering Provider:  same    Additional Information:  Only has a few pills left.       
Medication pended for refill. LOV: 12/20/21  
Yes

## 2024-08-17 NOTE — PHYSICAL THERAPY INITIAL EVALUATION ADULT - PRECAUTIONS/LIMITATIONS, REHAB EVAL
hemorrhage watch, confirmed w/ ortho re: WBAT LLE (and anticipate NWB LUE included in PT order (not ordered by ortho) was typo/fall precautions/seizure precautions hemorrhage watch-DC'd?, confirmed w/ ortho re: WBAT LLE (and anticipate NWB LUE included in PT order (not ordered by ortho) was typo/fall precautions/seizure precautions confirmed w/ ortho re: WBAT LLE (and anticipate NWB LUE included in PT order (not ordered by ortho) was typo, conferred w/ trauma re: whether concern for vessel injury d/t proximity to fx fragment noted on CT- per trauma vessel injury here not of concern (would have been apparent/known)/fall precautions/seizure precautions

## 2024-08-17 NOTE — CONSULT NOTE ADULT - SUBJECTIVE AND OBJECTIVE BOX
Patient is a 63y Female who is present in SICU with T7-9 mild VCF seen on CT chest. Patient with L acetabular fx and seizures on this admission, patient is currently somnolent in SICU. Patient not cooperative with interview or complaint with exam. Sister is at bedside. Patient brought to ED with seizures. Sister states that patient is unable to walk due to the pain. Sister reports patient has not previously complained of any numbness or tingling. No previous orthopedic history.    HEALTH ISSUES - PROBLEM Dx:  Closed pelvic fracture    Traumatic hematoma    Thoracic compression fracture    Shoulder pain, left            MEDICATIONS  (STANDING):  busPIRone 15 milliGRAM(s) Oral two times a day  donepezil 10 milliGRAM(s) Oral at bedtime  lidocaine   4% Patch 1 Patch Transdermal every 24 hours  memantine 10 milliGRAM(s) Oral two times a day  mirtazapine 30 milliGRAM(s) Oral at bedtime  oxybutynin 5 milliGRAM(s) Oral daily  piperacillin/tazobactam IVPB. 3.375 Gram(s) IV Intermittent once  piperacillin/tazobactam IVPB.- 3.375 Gram(s) IV Intermittent once  sodium chloride 0.9%. 1000 milliLiter(s) IV Continuous <Continuous>      Allergies    No Known Allergies    Intolerances        PAST MEDICAL & SURGICAL HISTORY:  Dementia                              11.9   8.65  )-----------( 189      ( 17 Aug 2024 17:12 )             36.5       17 Aug 2024 05:32    140    |  111    |  20     ----------------------------<  142    3.7     |  23     |  1.00     Ca    8.3        17 Aug 2024 05:32  Phos  4.1       17 Aug 2024 05:32  Mg     2.3       17 Aug 2024 05:32    TPro  5.7    /  Alb  2.9    /  TBili  0.8    /  DBili  x      /  AST  36     /  ALT  33     /  AlkPhos  80     17 Aug 2024 05:32      PT/INR - ( 16 Aug 2024 15:44 )   PT: 10.8 sec;   INR: 0.95 ratio         PTT - ( 16 Aug 2024 15:44 )  PTT:29.5 sec    Urinalysis Basic - ( 17 Aug 2024 05:32 )    Color: x / Appearance: x / SG: x / pH: x  Gluc: 142 mg/dL / Ketone: x  / Bili: x / Urobili: x   Blood: x / Protein: x / Nitrite: x   Leuk Esterase: x / RBC: x / WBC x   Sq Epi: x / Non Sq Epi: x / Bacteria: x        Vital Signs Last 24 Hrs  T(C): 36.6 (08-17-24 @ 05:00), Max: 38.5 (08-17-24 @ 01:06)  T(F): 97.9 (08-17-24 @ 05:00), Max: 101.3 (08-17-24 @ 01:06)  HR: 118 (08-17-24 @ 18:00) (89 - 131)  BP: 131/62 (08-17-24 @ 18:00) (91/43 - 145/115)  BP(mean): 74 (08-17-24 @ 18:00) (58 - 119)  RR: 27 (08-17-24 @ 18:00) (13 - 27)  SpO2: 98% (08-17-24 @ 18:00) (93% - 100%)    Gen: Somnolent, uncooperative with exam or interview     Spine PE:  Skin intact  No gross deformity  Unable to answer if any midline tenderness  No bony step offs  No paraspinal muscle ttp/hypertonicity   Positive LLE clonus  Negative babinski  BL UE carvalho+    Unable to follow commands to test C5-T1 motor or sensory   Unable to follow commands to test L2-S1 motor or sensory               Imaging:   CT chest:  Age indeterminate, possibly acute/subacute, T7-T9 mild compression   fracture deformities. No significant retropulsion of bone into the   epidural space at T7 or T8, minimal at T9    A/P: 63y Female with  mild T7-9 VCF   Pain control  Can recommend TLSO for comfort   WBAT with assistive devices as needed  Will re-examine once patient is more awake   Will d/w ortho spine attending and update plan as needed

## 2024-08-17 NOTE — PHYSICAL THERAPY INITIAL EVALUATION ADULT - REFERRAL TO ANOTHER SERVICE NEEDED, PT EVAL
ortho spine consult re: compression fxs/neurology/orthopedics ?ortho spine consult re: compression fxs/neurology/orthopedics

## 2024-08-17 NOTE — PHYSICAL THERAPY INITIAL EVALUATION ADULT - DIAGNOSIS, PT EVAL
T 7-9 vertebral compression fx , L acetabular fx,  L femoral head displacement, and Left sup pubic ramus fx with pelvic hematoma, benzo withdrawal sz

## 2024-08-17 NOTE — CONSULT NOTE ADULT - SUBJECTIVE AND OBJECTIVE BOX
63 year old woman with HTN, dementia, presenting with lower back pain, found to have thoracic compression fractures, acetabular fracture, and pubic ramis fracture, who was witnessed to have a generalized tonic clonic seizure in the ED.      Patient unable to contribute to obtaining history.  She is somnolent during the examination.      I called her sister Betzy Us ([phone]).  According to patient's sister, patient is in a memory care facility for last 5 months, prior to that she was in regular assisted living for 1.5 years.  Currently, she requires complete assistance with ADL's, including dressing, bathing, and toileting.  She has normal ambulatory status, but she struggles to find words, and speak fluently.  She does have frequent emotional outbursts, and cries often, but she has not had other elissa behavioral disturbances.  She received a diagnosis of Alzheimer's dementia in 2018.  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  She was  but had a divorce in her early 50's.  She has 2 adult daughters.      As for seizures, she has no prior seizure history.  No family members with epilepsy.  No prior CNS infection, or stroke.  Of note, in the memory care facility, she was being given ativan 3x daily, but due to vacation of one of the providers, ativan was not given for last several days, and she was transitioned instead to hydroxyzine for anxiety.      She has no smoking or drinking history.         On exam;   GEN: woman sleeping in bed, NAD  CV: tachy, regular, S1, S2, no extra sounds  PULM: CTAB  HEENT: no nuchal rigidity  EXTREM: no CCE  GI: soft, non tender  SKIN: no rashes.     NEURO:   Somnolent.  Does not open eyes throughout the exam.  With persistent stimulus, she states her name, and folllowed command to stick out her tongue.  She said her age was 69.  She did not answer any other orientation or naming questions.    Pupils 3-2mm, symmetric, no blink to threat bilaterally, VOR limted, but present, no facial asymmetry on grimace.   MOTOR: limited exam, withdraws bilateral upper and lower extremities symmetrically. Upper extremities demonstrating spontaneous antigravity strength.    SENSORY: grimaces with noxious stimulus throughout.  Withdrawing.   COORDINATION: unable to assess.     CTH images reviewed: motion degraded, no hemorrhage, or large territorial infarct.

## 2024-08-17 NOTE — PATIENT PROFILE ADULT - FUNCTIONAL ASSESSMENT - BASIC MOBILITY 6.
1-calculated by average/Not able to assess (calculate score using [facility] averaging method)

## 2024-08-17 NOTE — H&P ADULT - NSHPLABSRESULTS_GEN_ALL_CORE
< from: CT Chest w/ IV Cont (08.16.24 @ 21:21) >    IMPRESSION:  Motion degraded exam.  No main, lobar or segmental pulmonary embolism.  Age-indeterminate T7-T9 mildcompression fractures.    < end of copied text >    < from: CT Abdomen and Pelvis w/ IV Cont (08.16.24 @ 17:31) >    IMPRESSION:    Acute left acetabular comminuted fracture primarily involving the medial   and posterior compartments of the acetabulum. Fracture is noted at the   left superior pubic ramus-  acetabular junction as well. There is   superior displacement of the left femoral head.    Large left pelvic extraperitoneal hematoma involving the sciatic foramen   and left pelvic sidewall related to comminuted left pelvic acetabular   fracture. Vessel injury is difficult to exclude. For example superior   gluteal artery courses very close to one of the fracture fragments.   Streak artifact limits evaluation for active bleeding.    Also, there is mild T9 compression fracture, which appears acute.   Clinical correlation is recommended. Correlate with dedicated thoracic   spine imaging.      < end of copied text >    < from: CT Head No Cont (08.16.24 @ 17:27) >    IMPRESSION:  No acute intracranial hemorrhage, mass effect, or midline shift. If   symptoms persist consider follow-up imaging with MRI of the brain if no   contraindications.    < end of copied text >

## 2024-08-18 LAB
ANION GAP SERPL CALC-SCNC: 5 MMOL/L — SIGNIFICANT CHANGE UP (ref 5–17)
BUN SERPL-MCNC: 12 MG/DL — SIGNIFICANT CHANGE UP (ref 7–23)
CALCIUM SERPL-MCNC: 8.3 MG/DL — LOW (ref 8.5–10.1)
CHLORIDE SERPL-SCNC: 113 MMOL/L — HIGH (ref 96–108)
CO2 SERPL-SCNC: 24 MMOL/L — SIGNIFICANT CHANGE UP (ref 22–31)
CREAT SERPL-MCNC: 0.87 MG/DL — SIGNIFICANT CHANGE UP (ref 0.5–1.3)
CULTURE RESULTS: NO GROWTH — SIGNIFICANT CHANGE UP
CULTURE RESULTS: SIGNIFICANT CHANGE UP
EGFR: 75 ML/MIN/1.73M2 — SIGNIFICANT CHANGE UP
GLUCOSE SERPL-MCNC: 127 MG/DL — HIGH (ref 70–99)
HCT VFR BLD CALC: 32.4 % — LOW (ref 34.5–45)
HCT VFR BLD CALC: 32.5 % — LOW (ref 34.5–45)
HCT VFR BLD CALC: 34.4 % — LOW (ref 34.5–45)
HGB BLD-MCNC: 10.7 G/DL — LOW (ref 11.5–15.5)
HGB BLD-MCNC: 10.7 G/DL — LOW (ref 11.5–15.5)
HGB BLD-MCNC: 11.1 G/DL — LOW (ref 11.5–15.5)
MAGNESIUM SERPL-MCNC: 2.1 MG/DL — SIGNIFICANT CHANGE UP (ref 1.6–2.6)
MCHC RBC-ENTMCNC: 32.4 PG — SIGNIFICANT CHANGE UP (ref 27–34)
MCHC RBC-ENTMCNC: 32.9 GM/DL — SIGNIFICANT CHANGE UP (ref 32–36)
MCV RBC AUTO: 98.5 FL — SIGNIFICANT CHANGE UP (ref 80–100)
PHOSPHATE SERPL-MCNC: 1.6 MG/DL — LOW (ref 2.5–4.5)
PLATELET # BLD AUTO: 177 K/UL — SIGNIFICANT CHANGE UP (ref 150–400)
POTASSIUM SERPL-MCNC: 3.4 MMOL/L — LOW (ref 3.5–5.3)
POTASSIUM SERPL-SCNC: 3.4 MMOL/L — LOW (ref 3.5–5.3)
RBC # BLD: 3.3 M/UL — LOW (ref 3.8–5.2)
RBC # FLD: 13.4 % — SIGNIFICANT CHANGE UP (ref 10.3–14.5)
SODIUM SERPL-SCNC: 142 MMOL/L — SIGNIFICANT CHANGE UP (ref 135–145)
SPECIMEN SOURCE: SIGNIFICANT CHANGE UP
SPECIMEN SOURCE: SIGNIFICANT CHANGE UP
WBC # BLD: 7.47 K/UL — SIGNIFICANT CHANGE UP (ref 3.8–10.5)
WBC # FLD AUTO: 7.47 K/UL — SIGNIFICANT CHANGE UP (ref 3.8–10.5)

## 2024-08-18 PROCEDURE — 99232 SBSQ HOSP IP/OBS MODERATE 35: CPT

## 2024-08-18 RX ORDER — POTASSIUM PHOSPHATE 236; 224 MG/ML; MG/ML
15 INJECTION, SOLUTION INTRAVENOUS ONCE
Refills: 0 | Status: COMPLETED | OUTPATIENT
Start: 2024-08-18 | End: 2024-08-18

## 2024-08-18 RX ORDER — ACETAMINOPHEN 325 MG/1
1000 TABLET ORAL EVERY 6 HOURS
Refills: 0 | Status: COMPLETED | OUTPATIENT
Start: 2024-08-18 | End: 2024-08-18

## 2024-08-18 RX ADMIN — MEMANTINE 10 MILLIGRAM(S): 7 CAPSULE, EXTENDED RELEASE ORAL at 10:50

## 2024-08-18 RX ADMIN — ACETAMINOPHEN 400 MILLIGRAM(S): 325 TABLET ORAL at 13:20

## 2024-08-18 RX ADMIN — ACETAMINOPHEN 400 MILLIGRAM(S): 325 TABLET ORAL at 21:02

## 2024-08-18 RX ADMIN — MEMANTINE 10 MILLIGRAM(S): 7 CAPSULE, EXTENDED RELEASE ORAL at 21:04

## 2024-08-18 RX ADMIN — POTASSIUM PHOSPHATE 62.5 MILLIMOLE(S): 236; 224 INJECTION, SOLUTION INTRAVENOUS at 09:10

## 2024-08-18 RX ADMIN — Medication 1 PATCH: at 20:59

## 2024-08-18 RX ADMIN — OXYBUTYNIN CHLORIDE 5 MILLIGRAM(S): 5 TABLET ORAL at 10:50

## 2024-08-18 RX ADMIN — PIPERACILLIN SODIUM AND TAZOBACTAM SODIUM 25 GRAM(S): 3; .375 INJECTION, POWDER, FOR SOLUTION INTRAVENOUS at 13:20

## 2024-08-18 RX ADMIN — ACETAMINOPHEN 1000 MILLIGRAM(S): 325 TABLET ORAL at 14:00

## 2024-08-18 RX ADMIN — PIPERACILLIN SODIUM AND TAZOBACTAM SODIUM 25 GRAM(S): 3; .375 INJECTION, POWDER, FOR SOLUTION INTRAVENOUS at 21:03

## 2024-08-18 RX ADMIN — DONEPEZIL HYDROCHLORIDE 10 MILLIGRAM(S): 5 TABLET, FILM COATED ORAL at 21:04

## 2024-08-18 RX ADMIN — Medication 2 MILLIGRAM(S): at 22:33

## 2024-08-18 RX ADMIN — PIPERACILLIN SODIUM AND TAZOBACTAM SODIUM 25 GRAM(S): 3; .375 INJECTION, POWDER, FOR SOLUTION INTRAVENOUS at 05:03

## 2024-08-18 RX ADMIN — ACETAMINOPHEN 1000 MILLIGRAM(S): 325 TABLET ORAL at 21:43

## 2024-08-18 RX ADMIN — Medication 2 MILLIGRAM(S): at 22:46

## 2024-08-18 RX ADMIN — Medication 1 PATCH: at 17:46

## 2024-08-18 RX ADMIN — LORAZEPAM 0.5 MILLIGRAM(S): 4 INJECTION INTRAMUSCULAR; INTRAVENOUS at 21:03

## 2024-08-18 RX ADMIN — ACETAMINOPHEN 1000 MILLIGRAM(S): 325 TABLET ORAL at 07:30

## 2024-08-18 RX ADMIN — ACETAMINOPHEN 400 MILLIGRAM(S): 325 TABLET ORAL at 06:32

## 2024-08-18 RX ADMIN — BUSPIRONE HYDROCHLORIDE 15 MILLIGRAM(S): 30 TABLET ORAL at 10:50

## 2024-08-18 RX ADMIN — BUSPIRONE HYDROCHLORIDE 15 MILLIGRAM(S): 30 TABLET ORAL at 21:03

## 2024-08-18 RX ADMIN — Medication 1 PATCH: at 10:50

## 2024-08-18 RX ADMIN — PIPERACILLIN SODIUM AND TAZOBACTAM SODIUM 25 GRAM(S): 3; .375 INJECTION, POWDER, FOR SOLUTION INTRAVENOUS at 00:59

## 2024-08-18 RX ADMIN — Medication 30 MILLIGRAM(S): at 21:03

## 2024-08-18 NOTE — PROGRESS NOTE ADULT - SUBJECTIVE AND OBJECTIVE BOX
Patient is a 63y Female who is present in SICU with T7-9 mild VCF seen on CT chest. Patient with L acetabular fx and seizures on this admission, patient is currently somnolent in SICU. Patient not cooperative with interview or complaint with exam. Aid for 1 to 1 is at bedside. Patient brought to ED with seizures. Sister states that patient is unable to walk due to the pain. Sister reports patient has not previously complained of any numbness or tingling. No previous orthopedic history.    HEALTH ISSUES - PROBLEM Dx:  Closed pelvic fracture    Traumatic hematoma    Thoracic compression fracture    Shoulder pain, left            MEDICATIONS  (STANDING):  busPIRone 15 milliGRAM(s) Oral two times a day  donepezil 10 milliGRAM(s) Oral at bedtime  lidocaine   4% Patch 1 Patch Transdermal every 24 hours  memantine 10 milliGRAM(s) Oral two times a day  mirtazapine 30 milliGRAM(s) Oral at bedtime  oxybutynin 5 milliGRAM(s) Oral daily  piperacillin/tazobactam IVPB. 3.375 Gram(s) IV Intermittent once  piperacillin/tazobactam IVPB.- 3.375 Gram(s) IV Intermittent once  sodium chloride 0.9%. 1000 milliLiter(s) IV Continuous <Continuous>      Allergies    No Known Allergies    Intolerances        PAST MEDICAL & SURGICAL HISTORY:  Dementia                     LABS:                          10.7   7.47  )-----------( 177      ( 18 Aug 2024 05:21 )             32.5     08-18    142  |  113<H>  |  12  ----------------------------<  127<H>  3.4<L>   |  24  |  0.87    Ca    8.3<L>      18 Aug 2024 05:21  Phos  1.6     08-18  Mg     2.1     08-18    TPro  5.7<L>  /  Alb  2.9<L>  /  TBili  0.8  /  DBili  x   /  AST  36  /  ALT  33  /  AlkPhos  80  08-17    PT/INR - ( 16 Aug 2024 15:44 )   PT: 10.8 sec;   INR: 0.95 ratio         PTT - ( 16 Aug 2024 15:44 )  PTT:29.5 sec            Urinalysis Basic - ( 17 Aug 2024 05:32 )    Color: x / Appearance: x / SG: x / pH: x  Gluc: 142 mg/dL / Ketone: x  / Bili: x / Urobili: x   Blood: x / Protein: x / Nitrite: x   Leuk Esterase: x / RBC: x / WBC x   Sq Epi: x / Non Sq Epi: x / Bacteria: x      Vital Signs Last 24 Hrs  T(C): 38.2 (18 Aug 2024 06:00), Max: 39 (17 Aug 2024 18:00)  T(F): 100.7 (18 Aug 2024 06:00), Max: 102.2 (17 Aug 2024 18:00)  HR: 114 (18 Aug 2024 06:00) (90 - 131)  BP: 95/53 (18 Aug 2024 06:00) (95/53 - 131/62)  BP(mean): 66 (18 Aug 2024 06:00) (63 - 90)  RR: 29 (18 Aug 2024 06:00) (13 - 29)  SpO2: 94% (18 Aug 2024 06:00) (93% - 100%)    Parameters below as of 17 Aug 2024 18:00  Patient On (Oxygen Delivery Method): nasal cannula  O2 Flow (L/min): 2        Gen: Somnolent, uncooperative with exam or interview     Spine PE:  Skin intact  No gross deformity  No midline tenderness  No bony step offs  No paraspinal muscle ttp/hypertonicity   Positive LLE clonus  Negative babinski  BL UE carvalho+    5/5 C5 to TI motor and sensation  L2- s1 sensation in tact  L2- R    Incomplete*****          Imaging:   CT chest:  Age indeterminate, possibly acute/subacute, T7-T9 mild compression   fracture deformities. No significant retropulsion of bone into the   epidural space at T7 or T8, minimal at T9    A/P: 63y Female with  mild T7-9 VCF   Pain control  Can recommend TLSO for comfort   WBAT with assistive devices as needed  Will re-examine once patient is more awake   Will d/w ortho spine attending and update plan as needed      Patient is a 63y Female who is present in SICU with T7-9 mild VCF seen on CT chest. Patient with L acetabular fx and seizures on this admission, patient is currently somnolent in SICU. Patient not cooperative with interview or complaint with exam. Aid for 1 to 1 is at bedside. Patient brought to ED with seizures. Sister states that patient is unable to walk due to the pain. Sister reports patient has not previously complained of any numbness or tingling. No previous orthopedic history.    HEALTH ISSUES - PROBLEM Dx:  Closed pelvic fracture    Traumatic hematoma    Thoracic compression fracture    Shoulder pain, left            MEDICATIONS  (STANDING):  busPIRone 15 milliGRAM(s) Oral two times a day  donepezil 10 milliGRAM(s) Oral at bedtime  lidocaine   4% Patch 1 Patch Transdermal every 24 hours  memantine 10 milliGRAM(s) Oral two times a day  mirtazapine 30 milliGRAM(s) Oral at bedtime  oxybutynin 5 milliGRAM(s) Oral daily  piperacillin/tazobactam IVPB. 3.375 Gram(s) IV Intermittent once  piperacillin/tazobactam IVPB.- 3.375 Gram(s) IV Intermittent once  sodium chloride 0.9%. 1000 milliLiter(s) IV Continuous <Continuous>      Allergies    No Known Allergies    Intolerances        PAST MEDICAL & SURGICAL HISTORY:  Dementia                     LABS:                          10.7   7.47  )-----------( 177      ( 18 Aug 2024 05:21 )             32.5     08-18    142  |  113<H>  |  12  ----------------------------<  127<H>  3.4<L>   |  24  |  0.87    Ca    8.3<L>      18 Aug 2024 05:21  Phos  1.6     08-18  Mg     2.1     08-18    TPro  5.7<L>  /  Alb  2.9<L>  /  TBili  0.8  /  DBili  x   /  AST  36  /  ALT  33  /  AlkPhos  80  08-17    PT/INR - ( 16 Aug 2024 15:44 )   PT: 10.8 sec;   INR: 0.95 ratio         PTT - ( 16 Aug 2024 15:44 )  PTT:29.5 sec            Urinalysis Basic - ( 17 Aug 2024 05:32 )    Color: x / Appearance: x / SG: x / pH: x  Gluc: 142 mg/dL / Ketone: x  / Bili: x / Urobili: x   Blood: x / Protein: x / Nitrite: x   Leuk Esterase: x / RBC: x / WBC x   Sq Epi: x / Non Sq Epi: x / Bacteria: x      Vital Signs Last 24 Hrs  T(C): 38.2 (18 Aug 2024 06:00), Max: 39 (17 Aug 2024 18:00)  T(F): 100.7 (18 Aug 2024 06:00), Max: 102.2 (17 Aug 2024 18:00)  HR: 114 (18 Aug 2024 06:00) (90 - 131)  BP: 95/53 (18 Aug 2024 06:00) (95/53 - 131/62)  BP(mean): 66 (18 Aug 2024 06:00) (63 - 90)  RR: 29 (18 Aug 2024 06:00) (13 - 29)  SpO2: 94% (18 Aug 2024 06:00) (93% - 100%)    Parameters below as of 17 Aug 2024 18:00  Patient On (Oxygen Delivery Method): nasal cannula  O2 Flow (L/min): 2        Gen: Somnolent, uncooperative with exam or interview     Spine PE:  Skin intact  No gross deformity  No midline tenderness  No bony step offs  No paraspinal muscle ttp/hypertonicity   Positive LLE clonus  Negative babinski  BL UE carvalho+    5/5 C5 to TI motor and SILT bilaterally  L2- S1 sensation in tact bilaterally  L2, L3- Left 4/5 limited due to pain  L2, L3- Right 4+/5  L4-S1 motor L 5/5  L4-S1 motor R 5/5        Imaging:   CT chest:  Age indeterminate, possibly acute/subacute, T7-T9 mild compression   fracture deformities. No significant retropulsion of bone into the   epidural space at T7 or T8, minimal at T9    A/P: 63y Female with  mild T7-9 VCF   Pain control  Can recommend TLSO for comfort   WBAT with assistive devices as needed  Will re-examine once patient is more awake   Will d/w ortho spine attending and update plan as needed      Patient is a 63y Female who is present in SICU with T7-9 mild VCF seen on CT chest. Patient with L acetabular fx and seizures on this admission, patient is currently somnolent in SICU. Patient not cooperative with interview or complaint with exam. Aid for 1 to 1 is at bedside. Patient brought to ED with seizures. Sister states that patient is unable to walk due to the pain. Sister reports patient has not previously complained of any numbness or tingling. No previous orthopedic history.    HEALTH ISSUES - PROBLEM Dx:  Closed pelvic fracture    Traumatic hematoma    Thoracic compression fracture    Shoulder pain, left            MEDICATIONS  (STANDING):  busPIRone 15 milliGRAM(s) Oral two times a day  donepezil 10 milliGRAM(s) Oral at bedtime  lidocaine   4% Patch 1 Patch Transdermal every 24 hours  memantine 10 milliGRAM(s) Oral two times a day  mirtazapine 30 milliGRAM(s) Oral at bedtime  oxybutynin 5 milliGRAM(s) Oral daily  piperacillin/tazobactam IVPB. 3.375 Gram(s) IV Intermittent once  piperacillin/tazobactam IVPB.- 3.375 Gram(s) IV Intermittent once  sodium chloride 0.9%. 1000 milliLiter(s) IV Continuous <Continuous>      Allergies    No Known Allergies    Intolerances        PAST MEDICAL & SURGICAL HISTORY:  Dementia                     LABS:                          10.7   7.47  )-----------( 177      ( 18 Aug 2024 05:21 )             32.5     08-18    142  |  113<H>  |  12  ----------------------------<  127<H>  3.4<L>   |  24  |  0.87    Ca    8.3<L>      18 Aug 2024 05:21  Phos  1.6     08-18  Mg     2.1     08-18    TPro  5.7<L>  /  Alb  2.9<L>  /  TBili  0.8  /  DBili  x   /  AST  36  /  ALT  33  /  AlkPhos  80  08-17    PT/INR - ( 16 Aug 2024 15:44 )   PT: 10.8 sec;   INR: 0.95 ratio         PTT - ( 16 Aug 2024 15:44 )  PTT:29.5 sec            Urinalysis Basic - ( 17 Aug 2024 05:32 )    Color: x / Appearance: x / SG: x / pH: x  Gluc: 142 mg/dL / Ketone: x  / Bili: x / Urobili: x   Blood: x / Protein: x / Nitrite: x   Leuk Esterase: x / RBC: x / WBC x   Sq Epi: x / Non Sq Epi: x / Bacteria: x      Vital Signs Last 24 Hrs  T(C): 38.2 (18 Aug 2024 06:00), Max: 39 (17 Aug 2024 18:00)  T(F): 100.7 (18 Aug 2024 06:00), Max: 102.2 (17 Aug 2024 18:00)  HR: 114 (18 Aug 2024 06:00) (90 - 131)  BP: 95/53 (18 Aug 2024 06:00) (95/53 - 131/62)  BP(mean): 66 (18 Aug 2024 06:00) (63 - 90)  RR: 29 (18 Aug 2024 06:00) (13 - 29)  SpO2: 94% (18 Aug 2024 06:00) (93% - 100%)    Parameters below as of 17 Aug 2024 18:00  Patient On (Oxygen Delivery Method): nasal cannula  O2 Flow (L/min): 2        Gen: Somnolent, uncooperative with exam or interview     Spine PE:  Skin intact  No gross deformity  No midline tenderness  No bony step offs  No paraspinal muscle ttp/hypertonicity   Positive LLE clonus  Negative babinski  BL UE carvalho+    5/5 C5 to TI motor and SILT bilaterally  L2- S1 sensation in tact bilaterally  L2, L3 motor- Left 4/5 limited due to pain  L2, L3- motor Right 4+/5  L4-S1 motor L 5/5  L4-S1 motor R 5/5        Imaging:   CT chest:  Age indeterminate, possibly acute/subacute, T7-T9 mild compression   fracture deformities. No significant retropulsion of bone into the   epidural space at T7 or T8, minimal at T9    A/P: 63y Female with  mild T7-9 VCF   Pain control  Can recommend TLSO for comfort   WBAT with assistive devices as needed  Will re-examine once patient is more awake   Will d/w ortho spine attending and update plan as needed      Patient is a 63y Female who is present in SICU with T7-9 mild VCF seen on CT chest. Patient with L acetabular fx and seizures on this admission, patient is currently somnolent in SICU. Patient not cooperative with interview or complaint with exam. Aid for 1 to 1 is at bedside. Patient brought to ED with seizures. Sister states that patient is unable to walk due to the pain. Sister reports patient has not previously complained of any numbness or tingling. No previous orthopedic history.    HEALTH ISSUES - PROBLEM Dx:  Closed pelvic fracture    Traumatic hematoma    Thoracic compression fracture    Shoulder pain, left            MEDICATIONS  (STANDING):  busPIRone 15 milliGRAM(s) Oral two times a day  donepezil 10 milliGRAM(s) Oral at bedtime  lidocaine   4% Patch 1 Patch Transdermal every 24 hours  memantine 10 milliGRAM(s) Oral two times a day  mirtazapine 30 milliGRAM(s) Oral at bedtime  oxybutynin 5 milliGRAM(s) Oral daily  piperacillin/tazobactam IVPB. 3.375 Gram(s) IV Intermittent once  piperacillin/tazobactam IVPB.- 3.375 Gram(s) IV Intermittent once  sodium chloride 0.9%. 1000 milliLiter(s) IV Continuous <Continuous>      Allergies    No Known Allergies    Intolerances        PAST MEDICAL & SURGICAL HISTORY:  Dementia                     LABS:                          10.7   7.47  )-----------( 177      ( 18 Aug 2024 05:21 )             32.5     08-18    142  |  113<H>  |  12  ----------------------------<  127<H>  3.4<L>   |  24  |  0.87    Ca    8.3<L>      18 Aug 2024 05:21  Phos  1.6     08-18  Mg     2.1     08-18    TPro  5.7<L>  /  Alb  2.9<L>  /  TBili  0.8  /  DBili  x   /  AST  36  /  ALT  33  /  AlkPhos  80  08-17    PT/INR - ( 16 Aug 2024 15:44 )   PT: 10.8 sec;   INR: 0.95 ratio         PTT - ( 16 Aug 2024 15:44 )  PTT:29.5 sec            Urinalysis Basic - ( 17 Aug 2024 05:32 )    Color: x / Appearance: x / SG: x / pH: x  Gluc: 142 mg/dL / Ketone: x  / Bili: x / Urobili: x   Blood: x / Protein: x / Nitrite: x   Leuk Esterase: x / RBC: x / WBC x   Sq Epi: x / Non Sq Epi: x / Bacteria: x      Vital Signs Last 24 Hrs  T(C): 38.2 (18 Aug 2024 06:00), Max: 39 (17 Aug 2024 18:00)  T(F): 100.7 (18 Aug 2024 06:00), Max: 102.2 (17 Aug 2024 18:00)  HR: 114 (18 Aug 2024 06:00) (90 - 131)  BP: 95/53 (18 Aug 2024 06:00) (95/53 - 131/62)  BP(mean): 66 (18 Aug 2024 06:00) (63 - 90)  RR: 29 (18 Aug 2024 06:00) (13 - 29)  SpO2: 94% (18 Aug 2024 06:00) (93% - 100%)    Parameters below as of 17 Aug 2024 18:00  Patient On (Oxygen Delivery Method): nasal cannula  O2 Flow (L/min): 2        Gen: Somnolent, uncooperative with exam or interview     Spine PE:  Skin intact  No gross deformity  No midline tenderness  No bony step offs  No paraspinal muscle ttp/hypertonicity   Positive LLE clonus  Negative babinski  BL UE carvalho+    5/5 C5 to TI motor and SILT bilaterally  L2- S1 sensation in tact bilaterally  L2, L3 motor- Left 3/5 limited due to pain  L2, L3- motor Right 4/5  L4-S1 motor L 5/5  L4-S1 motor R 5/5        Imaging:   CT chest:  Age indeterminate, possibly acute/subacute, T7-T9 mild compression   fracture deformities. No significant retropulsion of bone into the   epidural space at T7 or T8, minimal at T9    A/P: 63y Female with  mild T7-9 VCF   Pain control  Can recommend TLSO for comfort   WBAT with assistive devices as needed  Will re-examine once patient is more awake   Will d/w ortho spine attending and update plan as needed

## 2024-08-18 NOTE — PROGRESS NOTE ADULT - ASSESSMENT
63F with hx early dementia admitted from [facility] with T 7-9 vertebral compression fx , L acetabular fx,  L femoral head fx, and Left sup pubic ramus fx with pelvic hematoma   no witnessed fall, pt woke up with left hip pain as per [facility].    Plan:  Multimodal pain control  Incentive spirometry  monitor CBC  Vitals, IS/OS Q 4  Diet  chemical dvt ppx on hold due to bleeding risk  ortho recs   medical service recs   neuro recs   Activity:  WBAT  PT   Resume home meds  SW for eventual D/C planning

## 2024-08-18 NOTE — PROGRESS NOTE ADULT - SUBJECTIVE AND OBJECTIVE BOX
Sub:  pain improved per nursing, Somnolent, uncooperative with exam or interview     Vitals:  T(C): 38.2 ( @ 06:00), Max: 39 ( @ 18:00)  HR: 114 ( @ 06:00) (90 - 131)  BP: 95/53 ( @ 06:00) (95/53 - 131/62)  RR: 29 ( @ 06:00) (13 - 29)  SpO2: 94% ( @ 06:00) (93% - 100%)     @ 07:01  -   @ 07:00  --------------------------------------------------------  IN:    Oral Fluid: 330 mL    sodium chloride 0.9%: 875 mL  Total IN: 1205 mL    OUT:    Indwelling Catheter - Urethral (mL): 1900 mL  Total OUT: 1900 mL    Total NET: -695 mL          Physical Exam:   GCS of 15  Airway is patent  Breathing is symmetric and unlabored  Neuro: CNII-XII grossly intact  Psych: normal affect  HEENT: Normocephalic, atraumatic, GENEVIEVE, EOM wnl, no otorrhea or hemotympanum b/l, no epistaxis or d/c b/l nares, no craniofacial bony pathology or tenderness b/l  Neck: Soft and supple, nontender to passive/active ROM exam. No crepitus, no ecchymosis, no hematoma, to exam, no JVD, no tracheal deviation  Cspine/thoracolumbrosacral spine: no gross bony pathology or tenderness to exam  Cardiovascular: S1S2 Present  Chest: no ecchymosis or tenderness to palpation. No sternal pathology or tenderness to exam. No crepitus, no ecchymosis, no hematoma. No penetrating thorcoabdominal trauma  Respiratory: Rate is 18; Respiratory Effort normal; no wheezes, rales or rhonchi to exam  ABD: bowel sounds (+), soft, nontender, non distended, no rebound, no guarding, no rigidity, no skin changes to exam. No pelvic instability to exam, no skin changes  Musculoskeletal: Pt has palpable b/l radial, femoral, dorsalis pedis pulses. All digits are warm and well perfused. No gross long bone pathology or tenderness to exam. Pt demonstrates grossly intact sensoromotor function. Pt has good capillary refill to digits, no calf edema or tenderness to exam. Tenderness to Left Hip, no overlying ecchymosis or hematoma.  Skin: no lesions or rashes to exam       @ 05:21                    10.7  CBC: 7.47>)-------(<177                     32.5                 142 | 113 | 12    CMP:  ----------------------< 127               3.4 | 24 | 0.87                      Ca:8.3  Phos:1.6  M.1               -|      |-        LFTs:  ------|-|-----             -|      |-   @ 17:12                    11.9  CBC: 8.65>)-------(<189                     36.5                 - | - | -    CMP:  ----------------------< -               - | - | -                      Ca:-  Phos:-  Mg:-               -|      |-        LFTs:  ------|-|-----             -|      |-   @ 05:32                    12.7  CBC: 7.86>)-------(<170                     38.8                 140 | 111 | 20    CMP:  ----------------------< 142               3.7 | 23 | 1.00                      Ca:8.3  Phos:4.1  M.3               0.8|      |36        LFTs:  ------|80|-----             -|      |-  08-16 @ 23:27                    13.2  CBC: 10.18>)-------(<226                     39.3                 - | - | -    CMP:  ----------------------< -               - | - | -                      Ca:-  Phos:-  Mg:-               -|      |-        LFTs:  ------|-|-----             -|      |-      Urinalysis with Rflx Culture (collected 24 @ 15:59)    Culture - Urine (collected 24 @ 15:59)  Source: .Urine None  Final Report (24 @ 00:19):    <10,000 CFU/mL Normal Urogenital Theresa      Current Inpatient Medications:  acetaminophen   IVPB .. 1000 milliGRAM(s) IV Intermittent every 6 hours PRN  busPIRone 15 milliGRAM(s) Oral two times a day  donepezil 10 milliGRAM(s) Oral at bedtime  lidocaine   4% Patch 1 Patch Transdermal every 24 hours  LORazepam   Injectable 0.5 milliGRAM(s) IV Push every 4 hours PRN  memantine 10 milliGRAM(s) Oral two times a day  mirtazapine 30 milliGRAM(s) Oral at bedtime  morphine  - Injectable 4 milliGRAM(s) IV Push every 4 hours PRN  morphine  - Injectable 2 milliGRAM(s) IV Push every 4 hours PRN  ondansetron Injectable 4 milliGRAM(s) IV Push every 6 hours PRN  oxybutynin 5 milliGRAM(s) Oral daily  piperacillin/tazobactam IVPB.. 3.375 Gram(s) IV Intermittent every 8 hours  sodium chloride 0.9%. 1000 milliLiter(s) (100 mL/Hr) IV Continuous <Continuous>

## 2024-08-19 LAB
ANION GAP SERPL CALC-SCNC: 3 MMOL/L — LOW (ref 5–17)
BUN SERPL-MCNC: 9 MG/DL — SIGNIFICANT CHANGE UP (ref 7–23)
CALCIUM SERPL-MCNC: 8.1 MG/DL — LOW (ref 8.5–10.1)
CHLORIDE SERPL-SCNC: 116 MMOL/L — HIGH (ref 96–108)
CO2 SERPL-SCNC: 24 MMOL/L — SIGNIFICANT CHANGE UP (ref 22–31)
CREAT SERPL-MCNC: 0.77 MG/DL — SIGNIFICANT CHANGE UP (ref 0.5–1.3)
EGFR: 87 ML/MIN/1.73M2 — SIGNIFICANT CHANGE UP
GLUCOSE SERPL-MCNC: 116 MG/DL — HIGH (ref 70–99)
HCT VFR BLD CALC: 31.4 % — LOW (ref 34.5–45)
HCT VFR BLD CALC: 38.5 % — SIGNIFICANT CHANGE UP (ref 34.5–45)
HGB BLD-MCNC: 10.1 G/DL — LOW (ref 11.5–15.5)
HGB BLD-MCNC: 12.3 G/DL — SIGNIFICANT CHANGE UP (ref 11.5–15.5)
MAGNESIUM SERPL-MCNC: 2 MG/DL — SIGNIFICANT CHANGE UP (ref 1.6–2.6)
MCHC RBC-ENTMCNC: 31.9 GM/DL — LOW (ref 32–36)
MCHC RBC-ENTMCNC: 31.9 PG — SIGNIFICANT CHANGE UP (ref 27–34)
MCHC RBC-ENTMCNC: 32.2 GM/DL — SIGNIFICANT CHANGE UP (ref 32–36)
MCHC RBC-ENTMCNC: 32.4 PG — SIGNIFICANT CHANGE UP (ref 27–34)
MCV RBC AUTO: 101.3 FL — HIGH (ref 80–100)
MCV RBC AUTO: 99.1 FL — SIGNIFICANT CHANGE UP (ref 80–100)
PHOSPHATE SERPL-MCNC: 2 MG/DL — LOW (ref 2.5–4.5)
PLATELET # BLD AUTO: 175 K/UL — SIGNIFICANT CHANGE UP (ref 150–400)
PLATELET # BLD AUTO: 205 K/UL — SIGNIFICANT CHANGE UP (ref 150–400)
POTASSIUM SERPL-MCNC: 3.6 MMOL/L — SIGNIFICANT CHANGE UP (ref 3.5–5.3)
POTASSIUM SERPL-SCNC: 3.6 MMOL/L — SIGNIFICANT CHANGE UP (ref 3.5–5.3)
RBC # BLD: 3.17 M/UL — LOW (ref 3.8–5.2)
RBC # BLD: 3.8 M/UL — SIGNIFICANT CHANGE UP (ref 3.8–5.2)
RBC # FLD: 13.2 % — SIGNIFICANT CHANGE UP (ref 10.3–14.5)
RBC # FLD: 13.3 % — SIGNIFICANT CHANGE UP (ref 10.3–14.5)
SODIUM SERPL-SCNC: 143 MMOL/L — SIGNIFICANT CHANGE UP (ref 135–145)
WBC # BLD: 7.4 K/UL — SIGNIFICANT CHANGE UP (ref 3.8–10.5)
WBC # BLD: 8.54 K/UL — SIGNIFICANT CHANGE UP (ref 3.8–10.5)
WBC # FLD AUTO: 7.4 K/UL — SIGNIFICANT CHANGE UP (ref 3.8–10.5)
WBC # FLD AUTO: 8.54 K/UL — SIGNIFICANT CHANGE UP (ref 3.8–10.5)

## 2024-08-19 PROCEDURE — 99233 SBSQ HOSP IP/OBS HIGH 50: CPT

## 2024-08-19 PROCEDURE — 99231 SBSQ HOSP IP/OBS SF/LOW 25: CPT

## 2024-08-19 PROCEDURE — 70450 CT HEAD/BRAIN W/O DYE: CPT | Mod: 26

## 2024-08-19 RX ORDER — SENNA 187 MG
2 TABLET ORAL AT BEDTIME
Refills: 0 | Status: DISCONTINUED | OUTPATIENT
Start: 2024-08-19 | End: 2024-08-23

## 2024-08-19 RX ORDER — POTASSIUM PHOSPHATE 236; 224 MG/ML; MG/ML
30 INJECTION, SOLUTION INTRAVENOUS ONCE
Refills: 0 | Status: COMPLETED | OUTPATIENT
Start: 2024-08-19 | End: 2024-08-19

## 2024-08-19 RX ORDER — OXYCODONE HYDROCHLORIDE 5 MG/1
10 TABLET ORAL EVERY 4 HOURS
Refills: 0 | Status: DISCONTINUED | OUTPATIENT
Start: 2024-08-19 | End: 2024-08-23

## 2024-08-19 RX ORDER — CHLORHEXIDINE GLUCONATE 40 MG/ML
1 SOLUTION TOPICAL
Refills: 0 | Status: DISCONTINUED | OUTPATIENT
Start: 2024-08-19 | End: 2024-08-23

## 2024-08-19 RX ORDER — LORAZEPAM 4 MG/ML
0.5 INJECTION INTRAMUSCULAR; INTRAVENOUS THREE TIMES A DAY
Refills: 0 | Status: DISCONTINUED | OUTPATIENT
Start: 2024-08-19 | End: 2024-08-23

## 2024-08-19 RX ORDER — PANTOPRAZOLE SODIUM 40 MG
40 TABLET, DELAYED RELEASE (ENTERIC COATED) ORAL
Refills: 0 | Status: DISCONTINUED | OUTPATIENT
Start: 2024-08-19 | End: 2024-08-23

## 2024-08-19 RX ORDER — LORAZEPAM 4 MG/ML
1 INJECTION INTRAMUSCULAR; INTRAVENOUS ONCE
Refills: 0 | Status: DISCONTINUED | OUTPATIENT
Start: 2024-08-19 | End: 2024-08-19

## 2024-08-19 RX ORDER — ACETAMINOPHEN 325 MG/1
1000 TABLET ORAL ONCE
Refills: 0 | Status: COMPLETED | OUTPATIENT
Start: 2024-08-19 | End: 2024-08-19

## 2024-08-19 RX ORDER — OXYCODONE HYDROCHLORIDE 5 MG/1
5 TABLET ORAL EVERY 4 HOURS
Refills: 0 | Status: DISCONTINUED | OUTPATIENT
Start: 2024-08-19 | End: 2024-08-23

## 2024-08-19 RX ORDER — POLYETHYLENE GLYCOL 3350 17 G/17G
17 POWDER, FOR SOLUTION ORAL DAILY
Refills: 0 | Status: DISCONTINUED | OUTPATIENT
Start: 2024-08-19 | End: 2024-08-23

## 2024-08-19 RX ORDER — POTASSIUM CHLORIDE 10 MEQ
40 TABLET, EXT RELEASE, PARTICLES/CRYSTALS ORAL ONCE
Refills: 0 | Status: COMPLETED | OUTPATIENT
Start: 2024-08-19 | End: 2024-08-19

## 2024-08-19 RX ORDER — SODIUM PHOSPHATE, MONOBASIC, MONOHYDRATE AND SODIUM PHOSPHATE, DIBASIC ANHYDROUS 276; 142 MG/ML; MG/ML
30 INJECTION, SOLUTION INTRAVENOUS ONCE
Refills: 0 | Status: DISCONTINUED | OUTPATIENT
Start: 2024-08-19 | End: 2024-08-19

## 2024-08-19 RX ADMIN — LORAZEPAM 0.5 MILLIGRAM(S): 4 INJECTION INTRAMUSCULAR; INTRAVENOUS at 11:16

## 2024-08-19 RX ADMIN — PIPERACILLIN SODIUM AND TAZOBACTAM SODIUM 25 GRAM(S): 3; .375 INJECTION, POWDER, FOR SOLUTION INTRAVENOUS at 05:04

## 2024-08-19 RX ADMIN — PIPERACILLIN SODIUM AND TAZOBACTAM SODIUM 25 GRAM(S): 3; .375 INJECTION, POWDER, FOR SOLUTION INTRAVENOUS at 16:30

## 2024-08-19 RX ADMIN — PIPERACILLIN SODIUM AND TAZOBACTAM SODIUM 25 GRAM(S): 3; .375 INJECTION, POWDER, FOR SOLUTION INTRAVENOUS at 23:21

## 2024-08-19 RX ADMIN — Medication 40 MILLIEQUIVALENT(S): at 10:03

## 2024-08-19 RX ADMIN — LORAZEPAM 1 MILLIGRAM(S): 4 INJECTION INTRAMUSCULAR; INTRAVENOUS at 14:23

## 2024-08-19 RX ADMIN — Medication 2 MILLIGRAM(S): at 18:40

## 2024-08-19 RX ADMIN — Medication 2 TABLET(S): at 21:25

## 2024-08-19 RX ADMIN — POTASSIUM PHOSPHATE 83.33 MILLIMOLE(S): 236; 224 INJECTION, SOLUTION INTRAVENOUS at 11:18

## 2024-08-19 RX ADMIN — Medication 2 MILLIGRAM(S): at 18:33

## 2024-08-19 RX ADMIN — MEMANTINE 10 MILLIGRAM(S): 7 CAPSULE, EXTENDED RELEASE ORAL at 10:03

## 2024-08-19 RX ADMIN — ACETAMINOPHEN 400 MILLIGRAM(S): 325 TABLET ORAL at 13:46

## 2024-08-19 RX ADMIN — LORAZEPAM 0.5 MILLIGRAM(S): 4 INJECTION INTRAMUSCULAR; INTRAVENOUS at 21:25

## 2024-08-19 RX ADMIN — OXYBUTYNIN CHLORIDE 5 MILLIGRAM(S): 5 TABLET ORAL at 10:03

## 2024-08-19 RX ADMIN — ACETAMINOPHEN 1000 MILLIGRAM(S): 325 TABLET ORAL at 14:15

## 2024-08-19 RX ADMIN — Medication 2 MILLIGRAM(S): at 10:15

## 2024-08-19 RX ADMIN — DONEPEZIL HYDROCHLORIDE 10 MILLIGRAM(S): 5 TABLET, FILM COATED ORAL at 21:26

## 2024-08-19 RX ADMIN — Medication 1 PATCH: at 23:21

## 2024-08-19 RX ADMIN — BUSPIRONE HYDROCHLORIDE 15 MILLIGRAM(S): 30 TABLET ORAL at 21:26

## 2024-08-19 RX ADMIN — Medication 1 PATCH: at 10:04

## 2024-08-19 RX ADMIN — Medication 2 MILLIGRAM(S): at 10:00

## 2024-08-19 RX ADMIN — LORAZEPAM 0.5 MILLIGRAM(S): 4 INJECTION INTRAMUSCULAR; INTRAVENOUS at 06:09

## 2024-08-19 RX ADMIN — Medication 30 MILLIGRAM(S): at 21:26

## 2024-08-19 RX ADMIN — MEMANTINE 10 MILLIGRAM(S): 7 CAPSULE, EXTENDED RELEASE ORAL at 21:25

## 2024-08-19 RX ADMIN — BUSPIRONE HYDROCHLORIDE 15 MILLIGRAM(S): 30 TABLET ORAL at 10:04

## 2024-08-19 NOTE — CHART NOTE - NSCHARTNOTEFT_GEN_A_CORE
Patient was measured and dispensed a low profile TLSO. Given the patient's other injuries and the alzheimer's a lower profile device will hopefully allow the patient to be compliant. The orthosis will stabilize and control the spine, reduce pain and ROM, allow for safer ambulation and assist in the healing process. Care use and function were explained to the family members. Contact info was provided. All went without incident.   [facility]  [phone]

## 2024-08-19 NOTE — PROGRESS NOTE ADULT - SUBJECTIVE AND OBJECTIVE BOX
CC:Patient is a 63y old  Female who presents with a chief complaint of inability to ambulate (18 Aug 2024 07:04)    Overnight: No acute events     Subjective:  Pt seen and examined at bedside with chaperone (1:1). Pt is AAOx1 (self), in no acute distress. Pt currently denies N/V/D HA, dizziness, blurry vision, numbness/tingling, SOB, cough, chest pain, palpitations, abd pain or urinary sx's.     ROS:  otherwise as abovementioned ROS    Vital Signs Last 24 Hrs  T(C): 36.9 (19 Aug 2024 09:40), Max: 37.3 (18 Aug 2024 18:35)  T(F): 98.4 (19 Aug 2024 09:40), Max: 99.2 (18 Aug 2024 18:35)  HR: 109 (19 Aug 2024 10:00) (98 - 129)  BP: 139/86 (19 Aug 2024 10:00) (96/58 - 139/86)  BP(mean): 101 (19 Aug 2024 10:00) (59 - 101)  RR: 18 (19 Aug 2024 10:00) (17 - 30)  SpO2: 97% (19 Aug 2024 06:00) (93% - 100%)    Parameters below as of 19 Aug 2024 06:00  Patient On (Oxygen Delivery Method): room air        Labs:               10.1   7.40  )-----------( 175      ( 19 Aug 2024 05:06 )             31.4     CBC Full  -  ( 19 Aug 2024 05:06 )  WBC Count : 7.40 K/uL  RBC Count : 3.17 M/uL  Hemoglobin : 10.1 g/dL  Hematocrit : 31.4 %  Platelet Count - Automated : 175 K/uL  Mean Cell Volume : 99.1 fl  Mean Cell Hemoglobin : 31.9 pg  Mean Cell Hemoglobin Concentration : 32.2 gm/dL  Auto Neutrophil # : x  Auto Lymphocyte # : x  Auto Monocyte # : x  Auto Eosinophil # : x  Auto Basophil # : x  Auto Neutrophil % : x  Auto Lymphocyte % : x  Auto Monocyte % : x  Auto Eosinophil % : x  Auto Basophil % : x    08-19    143  |  116<H>  |  9   ----------------------------<  116<H>  3.6   |  24  |  0.77    Ca    8.1<L>      19 Aug 2024 05:06  Phos  2.0     08-19  Mg     2.0     08-19              Meds:  acetaminophen   IVPB .. 1000 milliGRAM(s) IV Intermittent every 6 hours PRN  busPIRone 15 milliGRAM(s) Oral two times a day  donepezil 10 milliGRAM(s) Oral at bedtime  lidocaine   4% Patch 1 Patch Transdermal every 24 hours  LORazepam     Tablet 0.5 milliGRAM(s) Oral three times a day  LORazepam   Injectable 1 milliGRAM(s) IV Push once  memantine 10 milliGRAM(s) Oral two times a day  mirtazapine 30 milliGRAM(s) Oral at bedtime  morphine  - Injectable 4 milliGRAM(s) IV Push every 4 hours PRN  morphine  - Injectable 2 milliGRAM(s) IV Push every 4 hours PRN  ondansetron Injectable 4 milliGRAM(s) IV Push every 6 hours PRN  oxybutynin 5 milliGRAM(s) Oral daily  piperacillin/tazobactam IVPB.. 3.375 Gram(s) IV Intermittent every 8 hours  sodium chloride 0.9%. 1000 milliLiter(s) IV Continuous <Continuous>      Physical exam:  GCS of 15  Pt is aaox1 (self)  Pt in no acute distress  Airway is patent  Breathing is symmetric and unlabored  Neuro: CN II-XII grossly intact  Psych: normal affect  HEENT: normocephalic, GENEVIEVE, EOM wnl, no gross craniofacial bony pathology to exam  Neck: No tracheal deviation, no JVD, no crepitus, no ecchymosis, no hematoma  Chest: No gross rib or sternal pathology or tenderness to exam, no crepitus, no ecchymosis, no hematoma  Resp: CTAB  CVS: S1S2(+)  ABD: bowel sounds (+), soft, nontender, non distended, no rebound, no guarding, no rigidity, no pelvic instability to exam  EXT: +TTP over LT anterior Hip. no calf tenderness or edema to exam b/l, pt has good capillary refill in all digits. Sensoromotor function grossly intact, on VTE prophylaxis  Skin: no adverse skin changes to exam

## 2024-08-19 NOTE — PROGRESS NOTE ADULT - ASSESSMENT
63F PMH early dementia, overactive bladder, presents from [facility] who complaint of b/l hip and lower back pain. Pt AAOX1, anxious and crying, not able to tell me why she is here. Pt had witnessed tonic clonic seizure in ED at approximately 2:45pm, was given Ativan 2mg IV. Per pt's nurse at [facility], pt was complaining of hip and back pain since this morning, had a witnessed seizure at lunch approximately 45 seconds. Pt did not fall, was lowered to floor. Pt was taking Ativan for anxiety last dose 9pm on 8/13/24  because her psychiatrist was away so the primary doctor gave her vistaril instead for anxiety. Nurse concerned this was likely benzo w/d seizure, no hx of seizures.  Nurse states she at baseline is AAOx1, is usually crying but can answer simple questions    IMP;  S/P seizure  L pelvis fracture  Large L pelvic extraperitoneal hematoma  T7-9 VCF    PLAN:  Patient admitted to Trauma  Medical management  Will order TLSO for comfort as per Dr. Hanson  Increase activity with PT/walker

## 2024-08-19 NOTE — PROGRESS NOTE ADULT - SUBJECTIVE AND OBJECTIVE BOX
NO acute events overnight, no new complaints, no new seizures.  Patient with alzheimers dementia.  EEG with moderate to severe diffuse/multi-focal cerebral dysfunction, not specific as to etiology. There were no epileptiform abnormalities recorded.  Home ativan dose restarted.      ROS: As stated above otherwise neg    MEDICATIONS  (STANDING):  busPIRone 15 milliGRAM(s) Oral two times a day  donepezil 10 milliGRAM(s) Oral at bedtime  lidocaine   4% Patch 1 Patch Transdermal every 24 hours  LORazepam     Tablet 0.5 milliGRAM(s) Oral three times a day  LORazepam   Injectable 1 milliGRAM(s) IV Push once  memantine 10 milliGRAM(s) Oral two times a day  mirtazapine 30 milliGRAM(s) Oral at bedtime  oxybutynin 5 milliGRAM(s) Oral daily  piperacillin/tazobactam IVPB.. 3.375 Gram(s) IV Intermittent every 8 hours  sodium chloride 0.9%. 1000 milliLiter(s) (100 mL/Hr) IV Continuous <Continuous>      Vital Signs Last 24 Hrs  T(C): 36.9 (19 Aug 2024 09:40), Max: 37.3 (18 Aug 2024 18:35)  T(F): 98.4 (19 Aug 2024 09:40), Max: 99.2 (18 Aug 2024 18:35)  HR: 109 (19 Aug 2024 10:00) (98 - 129)  BP: 139/86 (19 Aug 2024 10:00) (96/58 - 139/86)  BP(mean): 101 (19 Aug 2024 10:00) (59 - 101)  RR: 18 (19 Aug 2024 10:00) (17 - 30)  SpO2: 97% (19 Aug 2024 06:00) (93% - 100%)    Parameters below as of 19 Aug 2024 06:00  Patient On (Oxygen Delivery Method): room air      NEURO:   Awake and alert.  Ox 2, not oriented to situation. Knows her name and age, follows simple commands, cannot comprehend complex commands.  Able to name and repeat.  No aphasia, no dysarthria  Pupils 3-2mm, symmetric, Does not follow my pen to L side but EOMI is intact ?L hemianopia , facial sensation intact. NO NLFD  MOTOR: LLE limited exam 2/2 pain Upper extremities 5-/5, RLE 4/5  SENSORY: Intact to fine touch, but L extinction?  COORDINATION: unable to assess 2/2 comprehension and LLE pain                        10.1   7.40  )-----------( 175      ( 19 Aug 2024 05:06 )             31.4     08-19    143  |  116<H>  |  9   ----------------------------<  116<H>  3.6   |  24  |  0.77    Ca    8.1<L>      19 Aug 2024 05:06  Phos  2.0     08-19  Mg     2.0     08-19        Radiology report:  CTH images reviewed: motion degraded, no hemorrhage, or large territorial infarct.

## 2024-08-19 NOTE — PROGRESS NOTE ADULT - SUBJECTIVE AND OBJECTIVE BOX
HPI:  63F PMH early dementia, overactive bladder, presents from [facility] who complaint of b/l hip and lower back pain. Pt AAOX1, anxious and crying, not able to tell me why she is here. Pt had witnessed tonic clonic seizure in ED at approximately 2:45pm, was given Ativan 2mg IV. Per pt's nurse at [facility], pt was complaining of hip and back pain since this morning, had a witnessed seizure at lunch approximately 45 seconds. Pt did not fall, was lowered to floor. Pt was taking Ativan for anxiety last dose 9pm on 8/13/24  because her psychiatrist was away so the primary doctor gave her vistaril instead for anxiety. Nurse concerned this was likely benzo w/d seizure, no hx of seizures.  Nurse states she at baseline is AAOx1, is usually crying but can answer simple questions (17 Aug 2024 01:03)    PAST MEDICAL & SURGICAL HISTORY:  Dementia    Home Medications:  busPIRone 30 mg oral tablet: 1 tab(s) orally 2 times a day (16 Aug 2024 22:42)  donepezil 10 mg oral tablet: 1 tab(s) orally once a day (16 Aug 2024 22:42)  memantine 10 mg oral tablet: 1 tab(s) orally 2 times a day (16 Aug 2024 22:42)  mirtazapine 30 mg oral tablet: 1 tab(s) orally once a day (at bedtime) (16 Aug 2024 22:42)  oxyBUTYnin 5 mg oral tablet: 1 tab(s) orally once a day (16 Aug 2024 22:42)    MEDICATIONS  (STANDING):  busPIRone 15 milliGRAM(s) Oral two times a day  donepezil 10 milliGRAM(s) Oral at bedtime  lidocaine   4% Patch 1 Patch Transdermal every 24 hours  memantine 10 milliGRAM(s) Oral two times a day  mirtazapine 30 milliGRAM(s) Oral at bedtime  oxybutynin 5 milliGRAM(s) Oral daily  piperacillin/tazobactam IVPB.. 3.375 Gram(s) IV Intermittent every 8 hours  potassium chloride   Powder 40 milliEquivalent(s) Oral once  potassium phosphate IVPB 30 milliMole(s) IV Intermittent once  sodium chloride 0.9%. 1000 milliLiter(s) (100 mL/Hr) IV Continuous <Continuous>    MEDICATIONS  (PRN):  acetaminophen   IVPB .. 1000 milliGRAM(s) IV Intermittent every 6 hours PRN Temp greater or equal to 38C (100.4F), Mild Pain (1 - 3)  LORazepam   Injectable 0.5 milliGRAM(s) IV Push every 4 hours PRN Agitation  morphine  - Injectable 2 milliGRAM(s) IV Push every 4 hours PRN Moderate Pain (4 - 6)  morphine  - Injectable 4 milliGRAM(s) IV Push every 4 hours PRN Severe Pain (7 - 10)  ondansetron Injectable 4 milliGRAM(s) IV Push every 6 hours PRN Nausea    Allergies    No Known Allergies    Intolerances    Vital Signs Last 24 Hrs  T(C): 37.3 (19 Aug 2024 06:00), Max: 37.3 (18 Aug 2024 18:35)  T(F): 99.2 (19 Aug 2024 06:00), Max: 99.2 (18 Aug 2024 18:35)  HR: 119 (19 Aug 2024 06:00) (92 - 129)  BP: 111/84 (19 Aug 2024 06:00) (84/46 - 123/60)  BP(mean): 94 (19 Aug 2024 06:00) (52 - 94)  RR: 30 (19 Aug 2024 06:00) (15 - 30)  SpO2: 97% (19 Aug 2024 06:00) (93% - 100%)    Parameters below as of 19 Aug 2024 06:00  Patient On (Oxygen Delivery Method): room air    Patient laying supine n bed with HOB at 30 degrees  A&OX2-knows she is in hospital-not sure why  Unable to elicit spinal tenderness T or LS spine  Patient states leg pain when performing SLR L side  No focal weakness LEs    STUDIES  CT Chest   Age indeterminate, possibly acute/subacute, T7-T9 mild compression   fracture deformities. No significant retropulsion of bone into the   epidural space at T7 or T8, minimal at T9. No grossly displaced rib   fracture line for motion artifact. Incomplete assessment of the sternum   due to motion.    CT A&P  Acute left acetabular comminuted fracture primarily involving the medial   and posterior compartments of the acetabulum. Fracture is noted at the   left superior pubic ramus-  acetabular junction as well. There is   superior displacement of the left femoral head.    Large left pelvic extraperitoneal hematoma involving the sciatic foramen   and left pelvic sidewall related to comminuted left pelvic acetabular   fracture. Vessel injury is difficult to exclude. For example superior   gluteal artery courses very close to one of the fracture fragments.   Streak artifact limits evaluation for active bleeding.    Also, there is mild T9 compression fracture, which appears acute.   Clinical correlation is recommended.

## 2024-08-19 NOTE — PROGRESS NOTE ADULT - ASSESSMENT
63 year old woman with HTN, alzheimer's dementia, presenting with lower back pain, found to have thoracic compression fractures, acetabular fracture, and pubic ramis fracture, who was witnessed to have a generalized tonic clonic seizure in the ED.  Her imaging does not show any acute findings. On exam this AM, more awake with possible L side extinction/hemianopia< Possibility of seizure in setting of benzodiazepine withdrawal.  However, she is at general risk for developing epilepsy due to the underlying progressive neurodegenerative condition.  Will also need to r/o brain lesion/stroke.    Recommendations  -advised MRI brain w/wo contrast, however, patient's family reporting she has claustrophobia, and requires sedation.  If unable to obtain, can obtain repeat CTH.   -seizure precautions, cushioned bed rails, and PRN ativan for seizure >1 minute  -no indication for AED's at this time.   -neurology to follow.  Please page or call with any acute neuro changes, or other issues we can help address.      D/W MAURICE Lomeli

## 2024-08-19 NOTE — PROGRESS NOTE ADULT - NS ATTEND AMEND GEN_ALL_CORE FT
Patient discussed with MONICA Winchester, and examined at the [facility].     More awake today compared to initial evaluation.    No complaints, including headache, or chest pain.     On exam:   GEN; NAD  CV: RRR, S1, S2  PULM: CTAB  NEURO:   Awake, alert, gave 2023 for the year, did not know the hospital, trouble naming common objects, was able to name her sisters at the bedside, followed commands, but had L/R confusion.   Pupils 3-2mm, symmetric, full VF's, no papilledema, conjugate gaze, no nystagmus, no facial weakness, no dysarthria, tongue midline.   MOTOR: normal bulk and tone, no drift, 4/5 symmetrically , biceps, deltoids, triceps, lower extremities no drift.   COORDINATION: no ataxia  SENSORY: intact to light touch, L/R confusion.     CTH images reviewed: motion degraded, no hemorrhage, or large territorial infarct.        63 year old woman with HTN, dementia, presenting with lower back pain, found to have thoracic compression fractures, acetabular fracture, and pubic ramis fracture, who was witnessed to have a generalized tonic clonic seizure in the ED.      On exam today, more alert than previous, disoriented, limited language, L/R confusion, no cranial nerve or appendicular findings.  No acute findings on imaging.  EEG showing multifocal slowing.  No epileptiform activity.      Possibility of seizure in setting of benzodiazepine withdrawal.  However, she is at general risk for developing epilepsy due to the underlying progressive neurodegenerative condition.      -was unable to tolerate MRI.  Please obtain a repeat CTH.   -seizure precautions, cushioned bed rails, and PRN ativan for seizure >1 minute  -now restarted on home benzoregiment  -no indication for AED's at this time as may be provoked in setting of benzo withdrawal.   -neurology to follow peripherally.  Please page or call with any acute neuro changes, or other issues we can help address. Patient discussed with MONICA Winchester, and examined at the [facility].     More awake today compared to initial evaluation.    No complaints, including headache, or chest pain.     On exam:   GEN; NAD  CV: RRR, S1, S2  PULM: CTAB  NEURO:   Awake, alert, gave 2023 for the year, did not know the hospital, trouble naming common objects, was able to name her sisters at the bedside, followed commands, but had L/R confusion.   Pupils 3-2mm, symmetric, full VF's, no papilledema, conjugate gaze, no nystagmus, no facial weakness, no dysarthria, tongue midline.   MOTOR: normal bulk and tone, no drift, 4/5 symmetrically , biceps, deltoids, triceps, lower extremities no drift.   COORDINATION: no ataxia  SENSORY: intact to light touch, L/R confusion.     CTH images reviewed: motion degraded, no hemorrhage, or large territorial infarct.        63 year old woman with HTN, dementia, presenting with lower back pain, found to have thoracic compression fractures, acetabular fracture, and pubic ramis fracture, who was witnessed to have a generalized tonic clonic seizure in the ED.      On exam today, more alert than previous, disoriented, limited language, L/R confusion, no cranial nerve or appendicular findings.  No acute findings on imaging.  EEG showing multifocal slowing.  No epileptiform activity.      Possibility of seizure in setting of benzodiazepine withdrawal.  However, she is at general risk for developing epilepsy due to the underlying progressive neurodegenerative condition.      -was unable to tolerate MRI.  Please obtain a repeat CTH.   -seizure precautions, cushioned bed rails, and PRN ativan for seizure >1 minute  -now restarted on home benzo regiment  -no indication for AED's at this time as may be provoked in setting of benzo withdrawal.   -neurology to follow peripherally.  Please page or call with any acute neuro changes, or other issues we can help address.        ADDENDUM:   repeat CTH performed and reviewed: no hemorrhage, no evolving infarct.    Continue seizure precautions.    Please page or call neurology with any recurrent seizure activity.

## 2024-08-19 NOTE — PROGRESS NOTE ADULT - ASSESSMENT
62y/o F with PMHx of early dementia admitted from [facility] with T9 vertebral compression fx , Lt acetabular fx,  Lt femoral head displacement, and Left superior pubic ramus fx with pelvic hematoma. Non witnessed fall, pt woke up with left hip pain as per [facility].  In ED, Pt then had witnessed tonic clonic seizure at approximately 2:45pm, was given Ativan 2mg IV.    #Unwitnessed Traumatic Fall  #T7-T9 vertebral compression fx  #Lt acetabular fx  #Lt femoral head displacement  #Lt superior pubic ramus fx  #Pelvic hematoma   #TC seizure     Plan:  Admit to SICU for continued serial CBC and neuro monitoring  -Plan to downgrade to floor today.  Orthopedic / Ortho-spine surgery consult appreciated  -Non-operative treatment 2/2 patient mental status and hx. Activity: WBAT  -TLSO for comfort   -Cont analgesic and antiemetic therapy  -IS and deep breathing exercises  -Stable serial CBC, cont daily   -D/C gray today, monitor TOV  -Monitor I/O, UO. Replace lytes PRN  -Zosyn for empiric coverage in setting of acute pelvic hematoma (stt 8/17)    Tonic-Clonic Sezuire  Neuro consult appreciated  EEG: Moderate to severe diffuse/multi-focal cerebral dysfunction, not specific as to etiology  -Seizure precautions, cushioned bed rails, and PRN ativan for seizure >1 minute  -Plan for MRI today  -Suspected Benzo w/drawl (Resumed Ativan PO TID)     Cont Diet  GI/DVT ppx (SCD only in setting of acute pelvic hematoma).   Cont appropriate home medication mgmt   PT: TONA ALCALA for eventual D/C planning      Case and plan discussed with surgical attending.

## 2024-08-20 LAB
ANION GAP SERPL CALC-SCNC: 5 MMOL/L — SIGNIFICANT CHANGE UP (ref 5–17)
BUN SERPL-MCNC: 8 MG/DL — SIGNIFICANT CHANGE UP (ref 7–23)
CALCIUM SERPL-MCNC: 8.2 MG/DL — LOW (ref 8.5–10.1)
CHLORIDE SERPL-SCNC: 113 MMOL/L — HIGH (ref 96–108)
CO2 SERPL-SCNC: 24 MMOL/L — SIGNIFICANT CHANGE UP (ref 22–31)
CREAT SERPL-MCNC: 0.82 MG/DL — SIGNIFICANT CHANGE UP (ref 0.5–1.3)
EGFR: 81 ML/MIN/1.73M2 — SIGNIFICANT CHANGE UP
GLUCOSE SERPL-MCNC: 123 MG/DL — HIGH (ref 70–99)
HCT VFR BLD CALC: 30.5 % — LOW (ref 34.5–45)
HCT VFR BLD CALC: 35.4 % — SIGNIFICANT CHANGE UP (ref 34.5–45)
HGB BLD-MCNC: 10.1 G/DL — LOW (ref 11.5–15.5)
HGB BLD-MCNC: 11.7 G/DL — SIGNIFICANT CHANGE UP (ref 11.5–15.5)
MAGNESIUM SERPL-MCNC: 2 MG/DL — SIGNIFICANT CHANGE UP (ref 1.6–2.6)
MCHC RBC-ENTMCNC: 32.3 PG — SIGNIFICANT CHANGE UP (ref 27–34)
MCHC RBC-ENTMCNC: 33.1 GM/DL — SIGNIFICANT CHANGE UP (ref 32–36)
MCV RBC AUTO: 97.4 FL — SIGNIFICANT CHANGE UP (ref 80–100)
PHOSPHATE SERPL-MCNC: 3.1 MG/DL — SIGNIFICANT CHANGE UP (ref 2.5–4.5)
PLATELET # BLD AUTO: 202 K/UL — SIGNIFICANT CHANGE UP (ref 150–400)
POTASSIUM SERPL-MCNC: 3.6 MMOL/L — SIGNIFICANT CHANGE UP (ref 3.5–5.3)
POTASSIUM SERPL-SCNC: 3.6 MMOL/L — SIGNIFICANT CHANGE UP (ref 3.5–5.3)
RBC # BLD: 3.13 M/UL — LOW (ref 3.8–5.2)
RBC # FLD: 13.2 % — SIGNIFICANT CHANGE UP (ref 10.3–14.5)
SODIUM SERPL-SCNC: 142 MMOL/L — SIGNIFICANT CHANGE UP (ref 135–145)
WBC # BLD: 6.66 K/UL — SIGNIFICANT CHANGE UP (ref 3.8–10.5)
WBC # FLD AUTO: 6.66 K/UL — SIGNIFICANT CHANGE UP (ref 3.8–10.5)

## 2024-08-20 PROCEDURE — 99221 1ST HOSP IP/OBS SF/LOW 40: CPT

## 2024-08-20 PROCEDURE — 99232 SBSQ HOSP IP/OBS MODERATE 35: CPT

## 2024-08-20 RX ORDER — ACETAMINOPHEN 325 MG/1
650 TABLET ORAL EVERY 6 HOURS
Refills: 0 | Status: DISCONTINUED | OUTPATIENT
Start: 2024-08-20 | End: 2024-08-23

## 2024-08-20 RX ORDER — POTASSIUM CHLORIDE 10 MEQ
40 TABLET, EXT RELEASE, PARTICLES/CRYSTALS ORAL ONCE
Refills: 0 | Status: COMPLETED | OUTPATIENT
Start: 2024-08-20 | End: 2024-08-20

## 2024-08-20 RX ADMIN — LORAZEPAM 0.5 MILLIGRAM(S): 4 INJECTION INTRAMUSCULAR; INTRAVENOUS at 05:07

## 2024-08-20 RX ADMIN — BUSPIRONE HYDROCHLORIDE 15 MILLIGRAM(S): 30 TABLET ORAL at 21:39

## 2024-08-20 RX ADMIN — ACETAMINOPHEN 650 MILLIGRAM(S): 325 TABLET ORAL at 19:00

## 2024-08-20 RX ADMIN — OXYBUTYNIN CHLORIDE 5 MILLIGRAM(S): 5 TABLET ORAL at 09:57

## 2024-08-20 RX ADMIN — OXYCODONE HYDROCHLORIDE 10 MILLIGRAM(S): 5 TABLET ORAL at 22:21

## 2024-08-20 RX ADMIN — ACETAMINOPHEN 650 MILLIGRAM(S): 325 TABLET ORAL at 18:20

## 2024-08-20 RX ADMIN — LORAZEPAM 0.5 MILLIGRAM(S): 4 INJECTION INTRAMUSCULAR; INTRAVENOUS at 21:39

## 2024-08-20 RX ADMIN — Medication 2 MILLIGRAM(S): at 15:38

## 2024-08-20 RX ADMIN — CHLORHEXIDINE GLUCONATE 1 APPLICATION(S): 40 SOLUTION TOPICAL at 06:29

## 2024-08-20 RX ADMIN — ACETAMINOPHEN 650 MILLIGRAM(S): 325 TABLET ORAL at 11:30

## 2024-08-20 RX ADMIN — BUSPIRONE HYDROCHLORIDE 15 MILLIGRAM(S): 30 TABLET ORAL at 09:56

## 2024-08-20 RX ADMIN — Medication 1 PATCH: at 21:13

## 2024-08-20 RX ADMIN — Medication 30 MILLIGRAM(S): at 21:39

## 2024-08-20 RX ADMIN — ACETAMINOPHEN 650 MILLIGRAM(S): 325 TABLET ORAL at 10:00

## 2024-08-20 RX ADMIN — LORAZEPAM 0.5 MILLIGRAM(S): 4 INJECTION INTRAMUSCULAR; INTRAVENOUS at 14:12

## 2024-08-20 RX ADMIN — DONEPEZIL HYDROCHLORIDE 10 MILLIGRAM(S): 5 TABLET, FILM COATED ORAL at 21:39

## 2024-08-20 RX ADMIN — PIPERACILLIN SODIUM AND TAZOBACTAM SODIUM 25 GRAM(S): 3; .375 INJECTION, POWDER, FOR SOLUTION INTRAVENOUS at 05:08

## 2024-08-20 RX ADMIN — Medication 1 PATCH: at 09:56

## 2024-08-20 RX ADMIN — Medication 40 MILLIGRAM(S): at 09:57

## 2024-08-20 RX ADMIN — Medication 2 MILLIGRAM(S): at 06:29

## 2024-08-20 RX ADMIN — Medication 1 PATCH: at 03:57

## 2024-08-20 RX ADMIN — Medication 40 MILLIEQUIVALENT(S): at 09:56

## 2024-08-20 RX ADMIN — OXYCODONE HYDROCHLORIDE 10 MILLIGRAM(S): 5 TABLET ORAL at 21:40

## 2024-08-20 RX ADMIN — MEMANTINE 10 MILLIGRAM(S): 7 CAPSULE, EXTENDED RELEASE ORAL at 21:39

## 2024-08-20 RX ADMIN — Medication 2 MILLIGRAM(S): at 15:11

## 2024-08-20 RX ADMIN — MEMANTINE 10 MILLIGRAM(S): 7 CAPSULE, EXTENDED RELEASE ORAL at 09:59

## 2024-08-20 RX ADMIN — Medication 1 PATCH: at 18:19

## 2024-08-20 RX ADMIN — Medication 2 MILLIGRAM(S): at 05:48

## 2024-08-20 NOTE — PROGRESS NOTE ADULT - SUBJECTIVE AND OBJECTIVE BOX
HPI:  63F PMH early dementia, overactive bladder, presents from [facility] who complaint of b/l hip and lower back pain. Pt AAOX1, anxious and crying, not able to tell me why she is here. Pt had witnessed tonic clonic seizure in ED at approximately 2:45pm, was given Ativan 2mg IV. Per pt's nurse at [facility], pt was complaining of hip and back pain since this morning, had a witnessed seizure at lunch approximately 45 seconds. Pt did not fall, was lowered to floor. Pt was taking Ativan for anxiety last dose 9pm on 8/13/24  because her psychiatrist was away so the primary doctor gave her vistaril instead for anxiety. Nurse concerned this was likely benzo w/d seizure, no hx of seizures.  Nurse states she at baseline is AAOx1, is usually crying but can answer simple questions (17 Aug 2024 01:03)    8/20/24/ Patient more coherent today-knows she is in hospital as result of fall. denies back pain. TLSO delivered    PAST MEDICAL & SURGICAL HISTORY:  Dementia    Home Medications:  busPIRone 30 mg oral tablet: 1 tab(s) orally 2 times a day (16 Aug 2024 22:42)  donepezil 10 mg oral tablet: 1 tab(s) orally once a day (16 Aug 2024 22:42)  memantine 10 mg oral tablet: 1 tab(s) orally 2 times a day (16 Aug 2024 22:42)  mirtazapine 30 mg oral tablet: 1 tab(s) orally once a day (at bedtime) (16 Aug 2024 22:42)  oxyBUTYnin 5 mg oral tablet: 1 tab(s) orally once a day (16 Aug 2024 22:42)    MEDICATIONS  (STANDING):  busPIRone 15 milliGRAM(s) Oral two times a day  donepezil 10 milliGRAM(s) Oral at bedtime  lidocaine   4% Patch 1 Patch Transdermal every 24 hours  memantine 10 milliGRAM(s) Oral two times a day  mirtazapine 30 milliGRAM(s) Oral at bedtime  oxybutynin 5 milliGRAM(s) Oral daily  piperacillin/tazobactam IVPB.. 3.375 Gram(s) IV Intermittent every 8 hours  potassium chloride   Powder 40 milliEquivalent(s) Oral once  potassium phosphate IVPB 30 milliMole(s) IV Intermittent once  sodium chloride 0.9%. 1000 milliLiter(s) (100 mL/Hr) IV Continuous <Continuous>    MEDICATIONS  (PRN):  acetaminophen   IVPB .. 1000 milliGRAM(s) IV Intermittent every 6 hours PRN Temp greater or equal to 38C (100.4F), Mild Pain (1 - 3)  LORazepam   Injectable 0.5 milliGRAM(s) IV Push every 4 hours PRN Agitation  morphine  - Injectable 2 milliGRAM(s) IV Push every 4 hours PRN Moderate Pain (4 - 6)  morphine  - Injectable 4 milliGRAM(s) IV Push every 4 hours PRN Severe Pain (7 - 10)  ondansetron Injectable 4 milliGRAM(s) IV Push every 6 hours PRN Nausea    Allergies    No Known Allergies    Intolerances    Vital Signs Last 24 Hrs  T(C): 37.3 (20 Aug 2024 05:00), Max: 37.8 (19 Aug 2024 13:47)  T(F): 99.2 (20 Aug 2024 05:00), Max: 100 (19 Aug 2024 13:47)  HR: 85 (20 Aug 2024 07:00) (85 - 128)  BP: 117/56 (20 Aug 2024 07:00) (104/75 - 151/65)  BP(mean): 75 (20 Aug 2024 07:00) (60 - 112)  RR: 18 (20 Aug 2024 07:00) (9 - 29)  SpO2: 98% (20 Aug 2024 07:00) (91% - 98%)    Parameters below as of 20 Aug 2024 07:00  Patient On (Oxygen Delivery Method): room air      Patient laying supine n bed with HOB at 20 degrees  A&OX2-knows she is in hospital-  No evidence of spinal tenderness T or LS spine  Patient states increased L leg pain when performing SLR L side and with AROM L hip, negative on R  No focal weakness LEs    STUDIES  CT Chest   Age indeterminate, possibly acute/subacute, T7-T9 mild compression   fracture deformities. No significant retropulsion of bone into the   epidural space at T7 or T8, minimal at T9. No grossly displaced rib   fracture line for motion artifact. Incomplete assessment of the sternum   due to motion.    CT A&P  Acute left acetabular comminuted fracture primarily involving the medial   and posterior compartments of the acetabulum. Fracture is noted at the   left superior pubic ramus-  acetabular junction as well. There is   superior displacement of the left femoral head.    Large left pelvic extraperitoneal hematoma involving the sciatic foramen   and left pelvic sidewall related to comminuted left pelvic acetabular   fracture. Vessel injury is difficult to exclude. For example superior   gluteal artery courses very close to one of the fracture fragments.   Streak artifact limits evaluation for active bleeding.    Also, there is mild T9 compression fracture, which appears acute.   Clinical correlation is recommended.     LABS                        10.1   6.66  )-----------( 202      ( 20 Aug 2024 06:43 )             30.5

## 2024-08-20 NOTE — PROGRESS NOTE ADULT - ASSESSMENT
64y/o F with PMHx of early dementia [facility] with T9 vertebral compression fx , Lt acetabular fx,  Lt femoral head displacement, and Left superior pubic ramus fx with pelvic hematoma. Non witnessed fall, pt woke up with left hip pain as per [facility].  In ED, Pt then had witnessed tonic clonic seizure at approximately 2:45pm, was given Ativan 2mg IV.    #Unwitnessed Traumatic Fall  #T7-T9 vertebral compression fx  #Lt acetabular fx  #Lt femoral head displacement  #Lt superior pubic ramus fx  #Pelvic hematoma   #TC seizure     Plan:  Admit under trauma service.   Orthopedic / Ortho-spine surgery consult appreciated  -Non-operative treatment 2/2 patient mental status and hx. Activity: WBAT  -TLSO for comfort   -Cont analgesic and antiemetic therapy  -IS and deep breathing exercises  -Stable serial CBC, cont daily   -Failed TOV, Maintain gray at this time. Urology consult for outpt followup   -Monitor I/O, UO. Replace lytes PRN  -Zosyn for empiric coverage in setting of acute pelvic hematoma (stt 8/17)    Tonic-Clonic Sezuire  Neuro consult appreciated  EEG: Moderate to severe diffuse/multi-focal cerebral dysfunction, not specific as to etiology  -Seizure precautions, cushioned bed rails, and PRN ativan for seizure >1 minute  -Pt unable to tolerate MRI head --> CTH without acute etiology.   -Suspected Benzo w/drawl (Resumed Ativan PO TID)     Cont Diet  GI/DVT ppx (SCD only in setting of acute pelvic hematoma).   Cont appropriate home medication mgmt   PT: TONA ALCALA for eventual D/C planning      Case and plan discussed with surgical attending.

## 2024-08-20 NOTE — PROGRESS NOTE ADULT - ASSESSMENT
63F PMH early dementia, overactive bladder, presents from [facility] who complaint of b/l hip and lower back pain. Pt AAOX1, anxious and crying, not able to tell me why she is here. Pt had witnessed tonic clonic seizure in ED at approximately 2:45pm, was given Ativan 2mg IV. Per pt's nurse at [facility], pt was complaining of hip and back pain since this morning, had a witnessed seizure at lunch approximately 45 seconds. Pt did not fall, was lowered to floor. Pt was taking Ativan for anxiety last dose 9pm on 8/13/24  because her psychiatrist was away so the primary doctor gave her vistaril instead for anxiety. Nurse concerned this was likely benzo w/d seizure, no hx of seizures.  Nurse states she at baseline is AAOx1, is usually crying but can answer simple questions    IMP;  S/P seizure  L pelvis/acetabular fracture  Large L pelvic extraperitoneal hematoma-H&H stabilized.  T7-9 VCF    PLAN:  Patient admitted to Trauma  Medical management  Serial H&H  TLSO available to get OOB  Increase activity with PT/walker if cleared by Ortho   63F PMH early dementia, overactive bladder, presents from [facility] who complaint of b/l hip and lower back pain. Pt AAOX1, anxious and crying, not able to tell me why she is here. Pt had witnessed tonic clonic seizure in ED at approximately 2:45pm, was given Ativan 2mg IV. Per pt's nurse at [facility], pt was complaining of hip and back pain since this morning, had a witnessed seizure at lunch approximately 45 seconds. Pt did not fall, was lowered to floor. Pt was taking Ativan for anxiety last dose 9pm on 8/13/24  because her psychiatrist was away so the primary doctor gave her vistaril instead for anxiety. Nurse concerned this was likely benzo w/d seizure, no hx of seizures.  Nurse states she at baseline is AAOx1, is usually crying but can answer simple questions    IMP;  S/P seizure  L pelvis/acetabular fracture  Large L pelvic extraperitoneal hematoma-H&H stabilized.  T7-9 VCF    PLAN:  Patient admitted to Trauma  Patient requires consult from Ortho for her acetabulum fracture  Medical management  Serial H&H  TLSO available to get OOB  Increase activity with PT/walker if cleared by Ortho  No acute intervention from Spine-reconsult prn

## 2024-08-20 NOTE — PROGRESS NOTE ADULT - SUBJECTIVE AND OBJECTIVE BOX
CC:Patient is a 63y old  Female who presents with a chief complaint of inability to ambulate (18 Aug 2024 07:04)    Overnight: No acute events       Subjective:  Subjective:  Pt seen and examined at bedside. Pt is AAOx1 (self), in no acute distress. Pt currently denies N/V/D HA, dizziness, blurry vision, numbness/tingling, SOB, cough, chest pain, palpitations, abd pain or urinary sx's.     ROS:  otherwise as abovementioned ROS    Vital Signs Last 24 Hrs  T(C): 37.2 (20 Aug 2024 08:09), Max: 37.8 (19 Aug 2024 13:47)  T(F): 98.9 (20 Aug 2024 08:09), Max: 100 (19 Aug 2024 13:47)  HR: 108 (20 Aug 2024 11:24) (85 - 128)  BP: 130/54 (20 Aug 2024 11:24) (104/75 - 151/65)  BP(mean): 75 (20 Aug 2024 11:24) (60 - 112)  RR: 14 (20 Aug 2024 08:00) (9 - 29)  SpO2: 97% (20 Aug 2024 11:24) (91% - 98%)    Parameters below as of 20 Aug 2024 11:24  Patient On (Oxygen Delivery Method): room air        Labs:                                10.1   6.66  )-----------( 202      ( 20 Aug 2024 06:43 )             30.5     CBC Full  -  ( 20 Aug 2024 06:43 )  WBC Count : 6.66 K/uL  RBC Count : 3.13 M/uL  Hemoglobin : 10.1 g/dL  Hematocrit : 30.5 %  Platelet Count - Automated : 202 K/uL  Mean Cell Volume : 97.4 fl  Mean Cell Hemoglobin : 32.3 pg  Mean Cell Hemoglobin Concentration : 33.1 gm/dL  Auto Neutrophil # : x  Auto Lymphocyte # : x  Auto Monocyte # : x  Auto Eosinophil # : x  Auto Basophil # : x  Auto Neutrophil % : x  Auto Lymphocyte % : x  Auto Monocyte % : x  Auto Eosinophil % : x  Auto Basophil % : x    08-20    142  |  113<H>  |  8   ----------------------------<  123<H>  3.6   |  24  |  0.82    Ca    8.2<L>      20 Aug 2024 06:43  Phos  3.1     08-20  Mg     2.0     08-20              Meds:  acetaminophen     Tablet .. 650 milliGRAM(s) Oral every 6 hours PRN  acetaminophen   IVPB .. 1000 milliGRAM(s) IV Intermittent every 6 hours PRN  busPIRone 15 milliGRAM(s) Oral two times a day  chlorhexidine 4% Liquid 1 Application(s) Topical <User Schedule>  donepezil 10 milliGRAM(s) Oral at bedtime  lidocaine   4% Patch 1 Patch Transdermal every 24 hours  LORazepam     Tablet 0.5 milliGRAM(s) Oral three times a day  memantine 10 milliGRAM(s) Oral two times a day  mirtazapine 30 milliGRAM(s) Oral at bedtime  morphine  - Injectable 2 milliGRAM(s) IV Push every 4 hours PRN  ondansetron Injectable 4 milliGRAM(s) IV Push every 6 hours PRN  oxybutynin 5 milliGRAM(s) Oral daily  oxyCODONE    IR 5 milliGRAM(s) Oral every 4 hours PRN  oxyCODONE    IR 10 milliGRAM(s) Oral every 4 hours PRN  pantoprazole    Tablet 40 milliGRAM(s) Oral before breakfast  piperacillin/tazobactam IVPB.. 3.375 Gram(s) IV Intermittent every 8 hours  polyethylene glycol 3350 17 Gram(s) Oral daily PRN  senna 2 Tablet(s) Oral at bedtime      Radiology:      Physical exam:  GCS of 15  Pt is aaox3  Pt in no acute distress  Airway is patent  Breathing is symmetric and unlabored  Neuro: CN II-XII grossly intact  Psych: normal affect  HEENT: normocephalic, GENEVIEVE, EOM wnl, no gross craniofacial bony pathology to exam  Neck: No tracheal deviation, no JVD, no crepitus, no ecchymosis, no hematoma  Chest: No gross rib or sternal pathology or tenderness to exam, no crepitus, no ecchymosis, no hematoma  Resp: CTAB  CVS: S1S2(+)  ABD: bowel sounds (+), soft, nontender, non distended, no rebound, no guarding, no rigidity, no pelvic instability to exam  EXT: no calf tenderness or edema to exam b/l, pt has good capillary refill in all digits. Sensoromotor function grossly intact, on VTE prophylaxis  Skin: no adverse skin changes to exam       CC: Patient is a 63y old  Female who presents with a chief complaint of inability to ambulate (18 Aug 2024 07:04)    Overnight: No acute events       Subjective:  Pt seen and examined at bedside. Pt is AAOx1 (self), in no acute distress. Pt currently denies N/V/D HA, dizziness, blurry vision, numbness/tingling, SOB, cough, chest pain, palpitations, abd pain or urinary sx's.     ROS:  otherwise as abovementioned ROS    Vital Signs Last 24 Hrs  T(C): 37.2 (20 Aug 2024 08:09), Max: 37.8 (19 Aug 2024 13:47)  T(F): 98.9 (20 Aug 2024 08:09), Max: 100 (19 Aug 2024 13:47)  HR: 108 (20 Aug 2024 11:24) (85 - 128)  BP: 130/54 (20 Aug 2024 11:24) (104/75 - 151/65)  BP(mean): 75 (20 Aug 2024 11:24) (60 - 112)  RR: 14 (20 Aug 2024 08:00) (9 - 29)  SpO2: 97% (20 Aug 2024 11:24) (91% - 98%)    Parameters below as of 20 Aug 2024 11:24  Patient On (Oxygen Delivery Method): room air        Labs:                          10.1   6.66  )-----------( 202      ( 20 Aug 2024 06:43 )             30.5     CBC Full  -  ( 20 Aug 2024 06:43 )  WBC Count : 6.66 K/uL  RBC Count : 3.13 M/uL  Hemoglobin : 10.1 g/dL  Hematocrit : 30.5 %  Platelet Count - Automated : 202 K/uL  Mean Cell Volume : 97.4 fl  Mean Cell Hemoglobin : 32.3 pg  Mean Cell Hemoglobin Concentration : 33.1 gm/dL  Auto Neutrophil # : x  Auto Lymphocyte # : x  Auto Monocyte # : x  Auto Eosinophil # : x  Auto Basophil # : x  Auto Neutrophil % : x  Auto Lymphocyte % : x  Auto Monocyte % : x  Auto Eosinophil % : x  Auto Basophil % : x    08-20    142  |  113<H>  |  8   ----------------------------<  123<H>  3.6   |  24  |  0.82    Ca    8.2<L>      20 Aug 2024 06:43  Phos  3.1     08-20  Mg     2.0     08-20              Meds:  acetaminophen     Tablet .. 650 milliGRAM(s) Oral every 6 hours PRN  acetaminophen   IVPB .. 1000 milliGRAM(s) IV Intermittent every 6 hours PRN  busPIRone 15 milliGRAM(s) Oral two times a day  chlorhexidine 4% Liquid 1 Application(s) Topical <User Schedule>  donepezil 10 milliGRAM(s) Oral at bedtime  lidocaine   4% Patch 1 Patch Transdermal every 24 hours  LORazepam     Tablet 0.5 milliGRAM(s) Oral three times a day  memantine 10 milliGRAM(s) Oral two times a day  mirtazapine 30 milliGRAM(s) Oral at bedtime  morphine  - Injectable 2 milliGRAM(s) IV Push every 4 hours PRN  ondansetron Injectable 4 milliGRAM(s) IV Push every 6 hours PRN  oxybutynin 5 milliGRAM(s) Oral daily  oxyCODONE    IR 5 milliGRAM(s) Oral every 4 hours PRN  oxyCODONE    IR 10 milliGRAM(s) Oral every 4 hours PRN  pantoprazole    Tablet 40 milliGRAM(s) Oral before breakfast  piperacillin/tazobactam IVPB.. 3.375 Gram(s) IV Intermittent every 8 hours  polyethylene glycol 3350 17 Gram(s) Oral daily PRN  senna 2 Tablet(s) Oral at bedtime      Radiology:  c< from: CT Head No Cont (08.19.24 @ 15:07) >  IMPRESSION:   Unchanged minimal periventricular white matter ischemia.   Mild atrophy most prominent in the BILATERAL temporal lobes.    --- End of Report ---    < end of copied text >      Physical exam:  GCS of 15  Pt is aaox3  Pt in no acute distress  Airway is patent  Breathing is symmetric and unlabored  Neuro: CN II-XII grossly intact  Psych: normal affect  HEENT: normocephalic, GENEVIEVE, EOM wnl, no gross craniofacial bony pathology to exam  Neck: No tracheal deviation, no JVD, no crepitus, no ecchymosis, no hematoma  Chest: No gross rib or sternal pathology or tenderness to exam, no crepitus, no ecchymosis, no hematoma  Resp: CTAB  CVS: S1S2(+)  ABD: bowel sounds (+), soft, nontender, non distended, no rebound, no guarding, no rigidity, no pelvic instability to exam  EXT: no calf tenderness or edema to exam b/l, pt has good capillary refill in all digits. Sensoromotor function grossly intact, on VTE prophylaxis  Skin: no adverse skin changes to exam  +Pal

## 2024-08-20 NOTE — CONSULT NOTE ADULT - SUBJECTIVE AND OBJECTIVE BOX
63 y/o F with PMH HTN, early dementia, overactive bladder presents from [facility] presented on 8/17/2024 with b/l hip and lower back pain. On presentation, the patient was A+Ox1, anxious and crying. She had a witnessed tonic clonic seizure in ED at approximately 2:45pm, was given Ativan 2mg IV. She also had a witnessed seizure at lunch approximately 45 seconds. As per H&P, the patient did not fall, was lowered to floor. Pt was taking Ativan for anxiety last dose 9pm on 8/13/24  because her psychiatrist was away so the primary doctor gave her vistaril instead for anxiety. Nurse concerned this was likely benzo w/d seizure, no hx of seizures.  Nurse states she at baseline is AAOx1, is usually crying but can answer simple questions. She was found to have T7-9 vertebral compression fx, L acetabular fx, L femoral head fx, and Left sup pubic ramus fx with pelvic hematoma. She was admitted to trauma under Dr. Gonsalez's service. Surgery was initially deferred due to mental status. Neurology was consulted for new onset seizures (it is suspected the patient had a benzodiazapine withdrawal seizure).    PMH: HTN, early dementia, overactive bladder    PSHx: None     Allergies: NKDA     Medications:   - Mirtazapine 30 mg QHS   - Buspirone 30 mg BID   - Oxybutynin 5 mg daily   - Donepezil 10 mg daily   - Memantine 10 mg BID     Social History: No current smoking, alcohol, drug use. Lives at [facility].     Family History: Non-contributory     ROS: Unable to obtain d/t confusion/dementia     ICU Vital Signs Last 24 Hrs  T(C): 37.2 (20 Aug 2024 08:09), Max: 37.8 (19 Aug 2024 13:47)  T(F): 98.9 (20 Aug 2024 08:09), Max: 100 (19 Aug 2024 13:47)  HR: 109 (20 Aug 2024 08:00) (85 - 128)  BP: 117/56 (20 Aug 2024 07:00) (104/75 - 151/65)  BP(mean): 75 (20 Aug 2024 07:00) (60 - 112)  ABP: --  ABP(mean): --  RR: 14 (20 Aug 2024 08:00) (9 - 29)  SpO2: 98% (20 Aug 2024 07:00) (91% - 98%)    O2 Parameters below as of 20 Aug 2024 07:00  Patient On (Oxygen Delivery Method): room air    General: Awake and alert, cooperative with exam. No acute distress.   Skin: Warm, dry, and pink. Abrasion on forehead   Eyes: Pupils equal and reactive to light. Extraocular eye movements intact. No conjunctival injection, discharge, or scleral icterus.   HEENT: Atraumatic, normocephalic. Moist mucus membranes.   Cardiology: Normal S1, S2. No murmurs, rubs, or gallops. Tachycardia   Respiratory: Lungs clear to ascultation bilaterally. Good air exchange. No wheezes, rales, or rhonchi. Normal chest expansion.   Gastrointestinal: Positive bowel sounds. Soft, non-tender, non-distended. No guarding, rigidity, or rebound tenderness. No hepatosplenomegaly.   Genitourinary: gray catheter with clear yellow urine.   Musculoskeletal: 5/5 motor strength in all extremities. Normal range of motion. LLE motor strength 4/5. RLE 5/5 motor strength   Extremities: No peripheral edema bilaterally. Dorsalis pedis pulses 2+ bilaterally.   Neurological: A+Ox2 (person, place). Cranial nerves 2-12 intact. Normal speech. No facial droop. No focal neurological deficits.   Psychiatric: Anxious affect and mood.     Vitals (8/20/24): HR . Labs (8/20/24): Hb 10.1 (baseline ~12), phosphorous 2.0. CT head (8/19/24): Unchanged minimal periventricular white matter ischemia. Mild atrophy most prominent in the BILATERAL temporal lobes.

## 2024-08-21 DIAGNOSIS — F41.8 OTHER SPECIFIED ANXIETY DISORDERS: ICD-10-CM

## 2024-08-21 LAB
ANION GAP SERPL CALC-SCNC: 7 MMOL/L — SIGNIFICANT CHANGE UP (ref 5–17)
BUN SERPL-MCNC: 11 MG/DL — SIGNIFICANT CHANGE UP (ref 7–23)
CALCIUM SERPL-MCNC: 9.1 MG/DL — SIGNIFICANT CHANGE UP (ref 8.5–10.1)
CHLORIDE SERPL-SCNC: 107 MMOL/L — SIGNIFICANT CHANGE UP (ref 96–108)
CO2 SERPL-SCNC: 24 MMOL/L — SIGNIFICANT CHANGE UP (ref 22–31)
CREAT SERPL-MCNC: 1.01 MG/DL — SIGNIFICANT CHANGE UP (ref 0.5–1.3)
EGFR: 63 ML/MIN/1.73M2 — SIGNIFICANT CHANGE UP
GLUCOSE SERPL-MCNC: 167 MG/DL — HIGH (ref 70–99)
HCT VFR BLD CALC: 32.8 % — LOW (ref 34.5–45)
HGB BLD-MCNC: 10.7 G/DL — LOW (ref 11.5–15.5)
MAGNESIUM SERPL-MCNC: 2.1 MG/DL — SIGNIFICANT CHANGE UP (ref 1.6–2.6)
MCHC RBC-ENTMCNC: 32.1 PG — SIGNIFICANT CHANGE UP (ref 27–34)
MCHC RBC-ENTMCNC: 32.6 GM/DL — SIGNIFICANT CHANGE UP (ref 32–36)
MCV RBC AUTO: 98.5 FL — SIGNIFICANT CHANGE UP (ref 80–100)
PHOSPHATE SERPL-MCNC: 2.9 MG/DL — SIGNIFICANT CHANGE UP (ref 2.5–4.5)
PLATELET # BLD AUTO: 221 K/UL — SIGNIFICANT CHANGE UP (ref 150–400)
POTASSIUM SERPL-MCNC: 4.1 MMOL/L — SIGNIFICANT CHANGE UP (ref 3.5–5.3)
POTASSIUM SERPL-SCNC: 4.1 MMOL/L — SIGNIFICANT CHANGE UP (ref 3.5–5.3)
RBC # BLD: 3.33 M/UL — LOW (ref 3.8–5.2)
RBC # FLD: 13.2 % — SIGNIFICANT CHANGE UP (ref 10.3–14.5)
SODIUM SERPL-SCNC: 138 MMOL/L — SIGNIFICANT CHANGE UP (ref 135–145)
WBC # BLD: 7.37 K/UL — SIGNIFICANT CHANGE UP (ref 3.8–10.5)
WBC # FLD AUTO: 7.37 K/UL — SIGNIFICANT CHANGE UP (ref 3.8–10.5)

## 2024-08-21 PROCEDURE — 99232 SBSQ HOSP IP/OBS MODERATE 35: CPT

## 2024-08-21 PROCEDURE — 90792 PSYCH DIAG EVAL W/MED SRVCS: CPT

## 2024-08-21 PROCEDURE — 99233 SBSQ HOSP IP/OBS HIGH 50: CPT

## 2024-08-21 RX ORDER — ESCITALOPRAM OXALATE 10 MG/1
5 TABLET ORAL ONCE
Refills: 0 | Status: COMPLETED | OUTPATIENT
Start: 2024-08-21 | End: 2024-08-21

## 2024-08-21 RX ORDER — ESCITALOPRAM OXALATE 10 MG/1
5 TABLET ORAL DAILY
Refills: 0 | Status: DISCONTINUED | OUTPATIENT
Start: 2024-08-22 | End: 2024-08-23

## 2024-08-21 RX ADMIN — LORAZEPAM 0.5 MILLIGRAM(S): 4 INJECTION INTRAMUSCULAR; INTRAVENOUS at 04:30

## 2024-08-21 RX ADMIN — Medication 1 PATCH: at 10:24

## 2024-08-21 RX ADMIN — OXYCODONE HYDROCHLORIDE 5 MILLIGRAM(S): 5 TABLET ORAL at 08:14

## 2024-08-21 RX ADMIN — Medication 1 PATCH: at 18:06

## 2024-08-21 RX ADMIN — OXYCODONE HYDROCHLORIDE 5 MILLIGRAM(S): 5 TABLET ORAL at 09:10

## 2024-08-21 RX ADMIN — Medication 2 MILLIGRAM(S): at 05:52

## 2024-08-21 RX ADMIN — ACETAMINOPHEN 650 MILLIGRAM(S): 325 TABLET ORAL at 21:50

## 2024-08-21 RX ADMIN — LORAZEPAM 0.5 MILLIGRAM(S): 4 INJECTION INTRAMUSCULAR; INTRAVENOUS at 21:46

## 2024-08-21 RX ADMIN — MEMANTINE 10 MILLIGRAM(S): 7 CAPSULE, EXTENDED RELEASE ORAL at 21:47

## 2024-08-21 RX ADMIN — Medication 2 MILLIGRAM(S): at 05:37

## 2024-08-21 RX ADMIN — Medication 30 MILLIGRAM(S): at 21:46

## 2024-08-21 RX ADMIN — Medication 40 MILLIGRAM(S): at 04:30

## 2024-08-21 RX ADMIN — ESCITALOPRAM OXALATE 5 MILLIGRAM(S): 10 TABLET ORAL at 14:50

## 2024-08-21 RX ADMIN — Medication 2 TABLET(S): at 21:47

## 2024-08-21 RX ADMIN — MEMANTINE 10 MILLIGRAM(S): 7 CAPSULE, EXTENDED RELEASE ORAL at 10:23

## 2024-08-21 RX ADMIN — ACETAMINOPHEN 650 MILLIGRAM(S): 325 TABLET ORAL at 04:45

## 2024-08-21 RX ADMIN — BUSPIRONE HYDROCHLORIDE 15 MILLIGRAM(S): 30 TABLET ORAL at 10:24

## 2024-08-21 RX ADMIN — OXYCODONE HYDROCHLORIDE 5 MILLIGRAM(S): 5 TABLET ORAL at 15:50

## 2024-08-21 RX ADMIN — BUSPIRONE HYDROCHLORIDE 15 MILLIGRAM(S): 30 TABLET ORAL at 21:46

## 2024-08-21 RX ADMIN — OXYCODONE HYDROCHLORIDE 5 MILLIGRAM(S): 5 TABLET ORAL at 16:45

## 2024-08-21 RX ADMIN — LORAZEPAM 0.5 MILLIGRAM(S): 4 INJECTION INTRAMUSCULAR; INTRAVENOUS at 14:50

## 2024-08-21 RX ADMIN — ACETAMINOPHEN 650 MILLIGRAM(S): 325 TABLET ORAL at 04:30

## 2024-08-21 RX ADMIN — OXYBUTYNIN CHLORIDE 5 MILLIGRAM(S): 5 TABLET ORAL at 10:24

## 2024-08-21 NOTE — CONSULT NOTE ADULT - SUBJECTIVE AND OBJECTIVE BOX
63F PMH early dementia, overactive bladder, presents from [facility] who complaint of b/l hip and lower back pain. Pt AAOX1, anxious and crying, not able to tell me why she is here. Pt had witnessed tonic clonic seizure in ED at approximately 2:45pm, was given Ativan 2mg IV. Per pt's nurse at [facility], pt was complaining of hip and back pain since this morning, had a witnessed seizure at lunch approximately 45 seconds. Pt did not fall, was lowered to floor. Pt was taking Ativan for anxiety last dose 9pm on 8/13/24  because her psychiatrist was away so the primary doctor gave her vistaril instead for anxiety. Nurse concerned this was likely benzo w/d seizure, no hx of seizures.  Nurse states she at baseline is AAOx1, is usually crying but can answer simple questions        Patient History:    Past Medical, Past Surgical, and Family History:  PAST MEDICAL HISTORY:  Dementia.    Allergies and Intolerances:        Allergies:  	No Known Allergies:     Home Medications:   * Patient Currently Takes Medications as of 16-Aug-2024 19:28 documented in Structured Notes  · 	mirtazapine 30 mg oral tablet: 1 tab(s) orally once a day (at bedtime)  · 	busPIRone 30 mg oral tablet: Last Dose Taken:  , 1 tab(s) orally 2 times a day  · 	oxyBUTYnin 5 mg oral tablet: 1 tab(s) orally once a day  · 	donepezil 10 mg oral tablet: 1 tab(s) orally once a day  · 	memantine 10 mg oral tablet: 1 tab(s) orally 2 times a day    .     Social History:  · Substance use	No      Tobacco Screening:  · Core Measure Site	Yes  · Has the patient used tobacco in the past 30 days?	No     Risk Assessment:    Present on Admission:  Deep Venous Thrombosis	no   Pulmonary Embolus	no      HIV Screening:  · In accordance with [place] State law, we offer every patient who comes to our ED an HIV test. Would you like to be tested today?	Opt out      Hepatitis C Test Questions:  · In accordance with [place] State Law, we offer every patient a Hepatitis C test. Would you like to be tested today?	Opt out      Review of Systems:  Other Review of Systems: All other review of systems negative, except as noted in HPI     Physical Exam:   Vital Signs Last 24 Hrs  T(C): 38.1 (21 Aug 2024 16:58), Max: 38.1 (21 Aug 2024 16:58)  T(F): 100.6 (21 Aug 2024 16:58), Max: 100.6 (21 Aug 2024 16:58)  HR: 129 (21 Aug 2024 04:20) (114 - 129)  BP: 111/70 (21 Aug 2024 15:47) (99/58 - 129/54)  BP(mean): 81 (21 Aug 2024 15:47) (63 - 92)  RR: --  SpO2: 95% (21 Aug 2024 15:47) (95% - 96%)    Parameters below as of 21 Aug 2024 15:47  Patient On (Oxygen Delivery Method): room air      Psych: normal affect  Head:  NC/AT  Neck: Soft and supple,  Cardiovascular: RRR,  S1S2 Present  Respiratory: CTA bilat, no wheezes, rales or rhonchi to exam  ABD: Soft, NT/ND, +BS, no bladder palp  Back:  no CVA tenderness  : Pal in place - urine yellow  Lower Ext no calf tenderness  Skin: no lesions or rashes to exam      LABS:                          10.7   7.37  )-----------( 221      ( 21 Aug 2024 11:37 )             32.8     08-21    138  |  107  |  11  ----------------------------<  167<H>  4.1   |  24  |  1.01    Ca    9.1      21 Aug 2024 11:37  Phos  2.9     08-21  Mg     2.1     08-21          Urinalysis Basic - ( 21 Aug 2024 11:37 )    Color: x / Appearance: x / SG: x / pH: x  Gluc: 167 mg/dL / Ketone: x  / Bili: x / Urobili: x   Blood: x / Protein: x / Nitrite: x   Leuk Esterase: x / RBC: x / WBC x   Sq Epi: x / Non Sq Epi: x / Bacteria: x    ACC: [id number] EXAM:  CT ABDOMEN AND PELVIS IC   ORDERED BY: PURNIMA PAREKH     PROCEDURE DATE:  08/16/2024          INTERPRETATION:  CLINICAL INFORMATION: Lower back and hip pain    COMPARISON: None.    CONTRAST/COMPLICATIONS:  IV Contrast: Omnipaque 350  90 cc administered   0 cc discarded  Oral Contrast: NONE  Complications: None reported at time of study completion    PROCEDURE:  CT of the Abdomen and Pelvis was performed.  Sagittal and coronal reformats were performed.    FINDINGS:    LOWER CHEST: Bibasilar dependent/subsegmental atelectasis. No visualized   pleural effusion.    LIVER: Liver size within normal limits. Main portal vein is patent.  BILE DUCTS: No distention  GALLBLADDER: Unremarkable CT appearance  SPLEEN: Spleen size within normal limits  PANCREAS: No acute peripancreatic inflammation  ADRENALS: Slight bilateral adrenal thickening  KIDNEYS/URETERS: No hydronephrosis or obstructing ureteral calculus    BLADDER: Minimally distended.  REPRODUCTIVE ORGANS: Uterus and adnexa are suboptimally characterized on   CT    BOWEL: Stomach is under distended. No small bowel distention. Appendix is   not obstructed. Mild stool burden of the colon and overall under   distention limits evaluation of the colonic mucosa. Colonic  diverticulosis, without diverticulitis.  PERITONEUM/RETROPERITONEUM: No ascites. Large left pelvic extraperitoneal   hematoma involving the sciatic foramen and left pelvic sidewall related   to comminuted left pelvic acetabular fracture. Vessel injury is difficult   to exclude. For example superior gluteal artery courses very close to one   of the fracture fragments. Streak artifact limits evaluation for active   bleeding.  VESSELS: No abdominal aortic aneurysm. See above peritoneum/   /retroperitoneum section.  LYMPH NODES: No enlarged lymph nodes  ABDOMINAL WALL: Small left inguinal hernia containing fat and probable   small region of necrosis on image 105 series 2. Tiny fat-containing   umbilical hernia.  BONES: Acute left acetabular comminuted fracture primarily involving the   medial and posterior compartments of the acetabulum. Fracture is noted at   the left superior pubic ramus-  acetabular junction as well. There is   superior displacement of the left femoral head. Also, there is mild T9   compression fracture, which appears acute. Clinical correlation is   recommended. Correlate with dedicated thoracic spine imaging. Multilevel   degenerative changes of the bones including spinal spondylosis and   osseous demineralization noted. Central canal and neural foramina are not   adequately assessed on this study.    IMPRESSION:    Acute left acetabular comminuted fracture primarily involving the medial   and posterior compartments of the acetabulum. Fracture is noted at the   left superior pubic ramus-  acetabular junction as well. There is   superior displacement of the left femoral head.    Large left pelvic extraperitoneal hematoma involving the sciatic foramen   and left pelvic sidewall related to comminuted left pelvic acetabular   fracture. Vessel injury is difficult to exclude. For example superior   gluteal artery courses very close to one of the fracture fragments.   Streak artifact limits evaluation for active bleeding.    Also, there is mild T9 compression fracture, which appears acute.   Clinical correlation is recommended. Correlate with dedicated thoracic   spine imaging.    Dr. Brandt discussed these findings with Dr. Purnima Parekh from ER on   8/16/2024 at 6:10 PM, with read back.

## 2024-08-21 NOTE — PROGRESS NOTE ADULT - SUBJECTIVE AND OBJECTIVE BOX
CC:Patient is a 63y old  Female who presents with a chief complaint of inability to ambulate (18 Aug 2024 07:04)    Overnight: No acute events     Subjective:  Pt seen and examined at bedside. Pt is AAOx1 (self), in no acute distress. Pt currently denies N/V/D HA, dizziness, blurry vision, numbness/tingling, SOB, cough, chest pain, palpitations, abd pain or urinary sx's.     ROS:  otherwise as abovementioned ROS      Vital Signs Last 24 Hrs  T(C): 36.7 (21 Aug 2024 09:50), Max: 38.1 (20 Aug 2024 17:20)  T(F): 98 (21 Aug 2024 09:50), Max: 100.6 (20 Aug 2024 17:20)  HR: 129 (21 Aug 2024 04:20) (97 - 129)  BP: 99/58 (21 Aug 2024 04:20) (99/58 - 130/68)  BP(mean): 63 (21 Aug 2024 04:20) (63 - 92)  RR: --  SpO2: 96% (21 Aug 2024 04:20) (93% - 100%)    Parameters below as of 21 Aug 2024 04:20  Patient On (Oxygen Delivery Method): room air        Labs:                                10.7   7.37  )-----------( 221      ( 21 Aug 2024 11:37 )             32.8     CBC Full  -  ( 21 Aug 2024 11:37 )  WBC Count : 7.37 K/uL  RBC Count : 3.33 M/uL  Hemoglobin : 10.7 g/dL  Hematocrit : 32.8 %  Platelet Count - Automated : 221 K/uL  Mean Cell Volume : 98.5 fl  Mean Cell Hemoglobin : 32.1 pg  Mean Cell Hemoglobin Concentration : 32.6 gm/dL  Auto Neutrophil # : x  Auto Lymphocyte # : x  Auto Monocyte # : x  Auto Eosinophil # : x  Auto Basophil # : x  Auto Neutrophil % : x  Auto Lymphocyte % : x  Auto Monocyte % : x  Auto Eosinophil % : x  Auto Basophil % : x    08-20    142  |  113<H>  |  8   ----------------------------<  123<H>  3.6   |  24  |  0.82    Ca    8.2<L>      20 Aug 2024 06:43  Phos  3.1     08-20  Mg     2.0     08-20              Meds:  acetaminophen     Tablet .. 650 milliGRAM(s) Oral every 6 hours PRN  acetaminophen   IVPB .. 1000 milliGRAM(s) IV Intermittent every 6 hours PRN  busPIRone 15 milliGRAM(s) Oral two times a day  chlorhexidine 4% Liquid 1 Application(s) Topical <User Schedule>  donepezil 10 milliGRAM(s) Oral at bedtime  lidocaine   4% Patch 1 Patch Transdermal every 24 hours  LORazepam     Tablet 0.5 milliGRAM(s) Oral three times a day  memantine 10 milliGRAM(s) Oral two times a day  mirtazapine 30 milliGRAM(s) Oral at bedtime  morphine  - Injectable 2 milliGRAM(s) IV Push every 4 hours PRN  ondansetron Injectable 4 milliGRAM(s) IV Push every 6 hours PRN  oxybutynin 5 milliGRAM(s) Oral daily  oxyCODONE    IR 5 milliGRAM(s) Oral every 4 hours PRN  oxyCODONE    IR 10 milliGRAM(s) Oral every 4 hours PRN  pantoprazole    Tablet 40 milliGRAM(s) Oral before breakfast  polyethylene glycol 3350 17 Gram(s) Oral daily PRN  senna 2 Tablet(s) Oral at bedtime      Radiology:      Physical exam:  GCS of 15  Pt is aaox3  Pt in no acute distress  Airway is patent  Breathing is symmetric and unlabored  Neuro: CN II-XII grossly intact  Psych: normal affect  HEENT: normocephalic, GENEVIEVE, EOM wnl, no gross craniofacial bony pathology to exam  Neck: No tracheal deviation, no JVD, no crepitus, no ecchymosis, no hematoma  Chest: No gross rib or sternal pathology or tenderness to exam, no crepitus, no ecchymosis, no hematoma  Resp: CTAB  CVS: S1S2(+)  ABD: bowel sounds (+), soft, nontender, non distended, no rebound, no guarding, no rigidity, no pelvic instability to exam  EXT: no calf tenderness or edema to exam b/l, pt has good capillary refill in all digits. Sensoromotor function grossly intact, on VTE prophylaxis  Skin: no adverse skin changes to exam  +Kae

## 2024-08-21 NOTE — BH CONSULTATION LIAISON ASSESSMENT NOTE - NSBHATTESTAPPBILLTIME_PSY_A_CORE
I attest my time as ISA is greater than 50% of the total combined time spent on qualifying patient care activities. I have reviewed and verified the documentation.

## 2024-08-21 NOTE — CONSULT NOTE ADULT - ASSESSMENT
65 y/o F presented with unwitnessed traumatic fall     1. Unwitnessed traumatic fall with subsequent T7-T9 vertebral compression fractures, left acetabular fracture, left femoral head displacement, left superior pubic ramus fracture, pelvic hematoma   - Hb has been stable -> f/u AM labs   - Non-operative management d/t mental status   - WBAT, TSLO for comfort   - Pain control: oxycodone, morphine   - Encourage ICS use   - Pt failed TOV on 8/19/2024 -> Pal placed 2 days ago hx of overactive bladder   - Zosyn for empiric coverage in the setting of acute pelvic hematoma   - PT evaluation: TONA  - Orthopedics recs aprpeciated: medical management, TLSO, increase activity with PT/walker   - Urology consult pending for pelvic hematoma     2. Tonic-clonic seizure suspected benzodiazapine withdrawal   - Neuro recs appreciated: no indication for AEDs at this time   - EEG: Moderate to severe diffuse/multi-focal cerebral dysfunction, not specific as to etiology.  There were no epileptiform abnormalities recorded.    - Seziure preacutions   - PRN Ativan for seizure > 1 minute   - Pt was unable to tolerate MRI -> repeat CT head completed:  Unchanged minimal periventricular white matter ischemia. Mild atrophy most prominent in the BILATERAL temporal lobes.  - Ativan PO TID home dose     3. History of HTN, early dementia, overactive bladder  - c/w home medications      DVT ppx:   SCDs (d/t pelvic hematoma)     Dispo: Back to  [facility], [place] planning     
A/P:  64 y/o female with urinary retention    Since pt being afraid of voiding, would send pt to rehab with gray to leg bag.  Pt can follow up with Dr. eMdeiros or Dr. Ramirez [phone] as outpatient for gray removal  Above discussed with Dr. Camacho
63 year old woman with HTN, dementia, presenting with lower back pain, found to have thoracic compression fractures, acetabular fracture, and pubic ramis fracture, who was witnessed to have a generalized tonic clonic seizure in the ED.    On exam this AM, very somnolent, without any focal neurological signs.  Her imaging does not show any acute findings.   Possibility of seizure in setting of benzodiazepine withdrawal.  However, she is at general risk for developing epilepsy due to the underlying progressive neurodegenerative condition.      -will obtain routine EEG  -advised MRI brain w/wo contrast, however, patient's family reporting she has claustrophobia, and requires sedation.  If unable to obtain MRI brain, can obtain repeat CTH.   -seizure precautions, cushioned bed rails, and PRN ativan for seizure >1 minute  -no indication for AED's at this time.   -neurology to follow.  Please page or call with any acute neuro changes, or other issues we can help address.    
65 y/o F presented with unwitnessed traumatic fall     1. Unwitnessed traumatic fall with subsequent T7-T9 vertebral compression fractures, left acetabular fracture, left femoral head displacement, left superior pubic ramus fracture, pelvic hematoma   - Pt downgraded to the floor today   - Hb has been stable -> f/u AM labs   - Non-operative management d/t mental status   - WBAT, TSLO for comfort   - Pain control: oxycodone, morphine   - Encourage ICS use   - Pt failed TOV on 8/19/2024 -> gray inserted again   - Gray d/c yesterday, monitor TOV   - Zosyn for empiric coverage in the setting of acute pelvic hematoma   - PT evaluation: TONA  - Orthopedics recs aprpeciated: medical management, TLSO, increase activity with PT/walker   - Urology consult pending for pelvic hematoma     2. Tonic-clonic seizure suspected benzodiazapine withdrawal   - Neuro recs appreciated: no indication for AEDs at this time   - EEG: Moderate to severe diffuse/multi-focal cerebral dysfunction, not specific as to etiology.  There were no epileptiform abnormalities recorded.    - Seziure preacutions   - PRN Ativan for seizure > 1 minute   - Pt was unable to tolerate MRI -> repeat CT head completed:  Unchanged minimal periventricular white matter ischemia. Mild atrophy most prominent in the BILATERAL temporal lobes.  - Resumted Ativan PO TID -> plan to taper     3. History of HTN, early dementia, overactive bladder  - c/w home medications; verified with pt at the bedside   - Pt anxious on exam today and tachycardic, will monitor HR closely     DVT ppx: SCDs (d/t pelvic hematoma)

## 2024-08-21 NOTE — BH CHART NOTE - NSEVENTNOTEFT_PSY_ALL_CORE
Search Terms: libertad mckeon, 1960Search Date: 08/21/2024 10:54:54 AM  The Drug Utilization Report below displays all of the controlled substance prescriptions, if any, that your patient has filled in the last twelve months. The information displayed on this report is compiled from pharmacy submissions to the Department, and accurately reflects the information as submitted by the pharmacies.    This report was requested by: Jolene Boston | Reference #: [id number]    Practitioner Count: 1  Pharmacy Count: 1  Current Opioid Prescriptions: 0  Current Benzodiazepine Prescriptions: 0  Current Stimulant Prescriptions: 0      Patient Demographic Information (PDI)       PDI	First Name	Last Name	Birth Date	Gender	Street Address	[facility] Code  A	Libertad Mckeon	1960	Female	[address] [facility]	[address]  B	Libertad	Osbaldo	1960	Female	[address] [facility]	[id number]    Prescription Information      PDI Filter:    PDI	My Rx	Current Rx	Drug Type	Rx Written	Rx Dispensed	Drug	Quantity	Days Supply	Prescriber Name	Prescriber ARTEMIO #	Payment Method	Dispenser  A	N	N	B	07/11/2024	07/12/2024	lorazepam 0.5 mg tablet	90	30	Jhon Rao V, MD	[id number]	Medicare	[facility]  A	N	N	B	06/13/2024	06/14/2024	lorazepam 0.5 mg tablet	90	30	Jhon Rao V, MD	[id number]	Medicare	[facility]  A	N	N	B	05/28/2024	05/28/2024	lorazepam 0.5 mg tablet	30	30	Jhon Rao V, MD	[id number]	Medicare	[facility]  A	N	N	B	04/18/2024	04/18/2024	lorazepam 0.5 mg tablet	30	30	Jhon Rao V, MD	[id number]	Medicare	[facility]  A	N	N	B	03/09/2024	03/14/2024	lorazepam 0.5 mg tablet	30	30	Roxanna Major	[id number]	Medicare	[facility]  B	N	N	B	02/12/2024	03/01/2024	lorazepam 0.5 mg tablet	30	30	Roxanna Major	[id number]	[facility].  B	N	N	B	01/30/2024	02/02/2024	lorazepam 0.5 mg tablet	30	30	Roxanna Major	[id number]	Insurance	[facility] [employer] [facility]  [facility]  B	N	N	B	12/29/2023	12/31/2023	lorazepam 0.5 mg tablet	30	30	Roxanna Major	[id number]	Insurance	[employer] [facility]  [employer]  B	N	N	B	12/01/2023	12/03/2023	lorazepam 0.5 mg tablet	30	30	Roxanna Major	[id number]	[facility]

## 2024-08-21 NOTE — CONSULT NOTE ADULT - SUBJECTIVE AND OBJECTIVE BOX
AURELIO BHAKTA  63y  Female      Patient is a 63y old  Female who presents with a chief complaint of inability to ambulate (18 Aug 2024 07:04)        PAST MEDICAL/SURGICAL HISTORY  PAST MEDICAL & SURGICAL HISTORY:  Dementia          REVIEW OF SYSTEMS:  CONSTITUTIONAL: No fever, weight loss, or fatigue  EYES: No eye pain, visual disturbances, or discharge  ENMT:  No difficulty hearing, tinnitus  NECK: No pain or stiffness  RESPIRATORY: No cough, wheezing, chills or hemoptysis; No shortness of breath  CARDIOVASCULAR: No chest pain, palpitations, dizziness, or leg swelling  GASTROINTESTINAL: No abdominal or epigastric pain. No nausea, vomiting, or hematemesis; No diarrhea or constipation.  GENITOURINARY: No dysuria, frequency, hematuria, +  incontinence  NEUROLOGICAL: No headaches, memory loss, loss of strength, numbness   SKIN: No itching  MUSCULOSKELETAL: No joint pain or swelling; No muscle, back, or extremity pain  PSYCHIATRIC: + anxious       T(C): 36.7 (08-21-24 @ 09:50), Max: 38.1 (08-20-24 @ 17:20)  HR: 129 (08-21-24 @ 04:20) (97 - 129)  BP: 99/58 (08-21-24 @ 04:20) (99/58 - 130/68)  RR: --  SpO2: 96% (08-21-24 @ 04:20) (93% - 100%)  Wt(kg): --Vital Signs Last 24 Hrs  T(C): 36.7 (21 Aug 2024 09:50), Max: 38.1 (20 Aug 2024 17:20)  T(F): 98 (21 Aug 2024 09:50), Max: 100.6 (20 Aug 2024 17:20)  HR: 129 (21 Aug 2024 04:20) (97 - 129)  BP: 99/58 (21 Aug 2024 04:20) (99/58 - 130/68)  BP(mean): 63 (21 Aug 2024 04:20) (63 - 92)  RR: --  SpO2: 96% (21 Aug 2024 04:20) (93% - 100%)    Parameters below as of 21 Aug 2024 04:20  Patient On (Oxygen Delivery Method): room air        PHYSICAL EXAM:  GENERAL: NAD + anxious   HEAD:  Atraumatic, Normocephalic  EYES: EOMI, PERRLA, conjunctiva and sclera clear  ENMT: Moist mucous membrane  NECK: Supple  NERVOUS SYSTEM:  Alert & Oriented X3, Motor Strength 5/5 B/L upper and lower extremities  CHEST/LUNG: Clear to percussion bilaterally; No rales, rhonchi, wheezing  HEART: Regular rate and rhythm; No murmurs, rubs, or gallops  ABDOMEN: Soft, Nontender, Nondistended; Bowel sounds present  EXTREMITIES:  2+ Peripheral Pulses, No clubbing, cyanosis, or edema      Consultant(s) Notes Reviewed:  [x ] YES  [ ] NO  Care Discussed with Consultants/Other Providers [ x] YES  [ ] NO    LABS:  CBC   08-21-24 @ 11:37  Hematcorit 32.8  Hemoglobin 10.7  Mean Cell Hemoglobin 32.1  Platelet Count-Automated 221  RBC Count 3.33  Red Cell Distrib Width 13.2  Wbc Count 7.37  08-20-24 @ 12:58  Hematcorit 35.4  Hemoglobin 11.7  Mean Cell Hemoglobin --  Platelet Count-Automated --  RBC Count --  Red Cell Distrib Width --  Wbc Count --      BMP  08-21-24 @ 11:37  Anion Gap. Serum 7  Blood Urea Nitrogen,Serm 11  Calcium, Total Serum 9.1  Carbon Dioxide, Serum 24  Chloride, Serum 107  Creatinine, Serum 1.01  eGFR in  --  eGFR in Non Afican American --  Gloucose, serum 167  Potassium, Serum 4.1  Sodium, Serum 138      08-20-24 @ 06:43  Anion Gap. Serum 5  Blood Urea Nitrogen,Serm 8  Calcium, Total Serum 8.2  Carbon Dioxide, Serum 24  Chloride, Serum 113  Creatinine, Serum 0.82  eGFR in  --  eGFR in Non Afican American --  Gloucose, serum 123  Potassium, Serum 3.6  Sodium, Serum 142      08-19-24 @ 05:06  Anion Gap. Serum 3  Blood Urea Nitrogen,Serm 9  Calcium, Total Serum 8.1  Carbon Dioxide, Serum 24  Chloride, Serum 116  Creatinine, Serum 0.77  eGFR in  --  eGFR in Non Afican American --  Gloucose, serum 116  Potassium, Serum 3.6  Sodium, Serum 143      CMP  08-21-24 @ 11:37  Rosi Aminotransferase(ALT/SGPT)--  Albumin, Serum --  Alkaline Phosphatase, Serum --  Anion Gap, Serum 7  Aspartate Aminotransferase (AST/SGOT)--  Bilirubin Total, Serum --  Blood Urea Nitrogen, Serum 11  Calcium,Total Serum 9.1  Carbon Dioxide, Serum 24  Chloride, Serum 107  Creatinine, Serum 1.01  eGFR if  --  eGFR if Non African American --  Glucose, Serum 167  Potassium, Serum 4.1  Protein Total, Serum --  Sodium, Serum 138        08-20-24 @ 06:43  Rosi Aminotransferase(ALT/SGPT)--  Albumin, Serum --  Alkaline Phosphatase, Serum --  Anion Gap, Serum 5  Aspartate Aminotransferase (AST/SGOT)--  Bilirubin Total, Serum --  Blood Urea Nitrogen, Serum 8  Calcium,Total Serum 8.2  Carbon Dioxide, Serum 24  Chloride, Serum 113  Creatinine, Serum 0.82  eGFR if  --  eGFR if Non African American --  Glucose, Serum 123  Potassium, Serum 3.6  Protein Total, Serum --  Sodium, Serum 142    08-19-24 @ 05:06  Rosi Aminotransferase(ALT/SGPT)--  Albumin, Serum --  Alkaline Phosphatase, Serum --  Anion Gap, Serum 3  Aspartate Aminotransferase (AST/SGOT)--  Bilirubin Total, Serum --  Blood Urea Nitrogen, Serum 9  Calcium,Total Serum 8.1  Carbon Dioxide, Serum 24  Chloride, Serum 116  Creatinine, Serum 0.77  eGFR if  --  eGFR if Non African American --  Glucose, Serum 116  Potassium, Serum 3.6      PT/INR      Amylase/Lipase    RADIOLOGY & ADDITIONAL TESTS:    Imaging Personally Reviewed:  [ ] YES  [ ] NO

## 2024-08-21 NOTE — BH CONSULTATION LIAISON ASSESSMENT NOTE - NSBHCHARTREVIEWVS_PSY_A_CORE FT
Vital Signs Last 24 Hrs  T(C): 36.7 (21 Aug 2024 09:50), Max: 38.1 (20 Aug 2024 17:20)  T(F): 98 (21 Aug 2024 09:50), Max: 100.6 (20 Aug 2024 17:20)  HR: 129 (21 Aug 2024 04:20) (97 - 129)  BP: 99/58 (21 Aug 2024 04:20) (99/58 - 130/68)  BP(mean): 63 (21 Aug 2024 04:20) (63 - 92)  RR: --  SpO2: 96% (21 Aug 2024 04:20) (93% - 100%)    Parameters below as of 21 Aug 2024 04:20  Patient On (Oxygen Delivery Method): room air

## 2024-08-21 NOTE — BH CONSULTATION LIAISON ASSESSMENT NOTE - HPI (INCLUDE ILLNESS QUALITY, SEVERITY, DURATION, TIMING, CONTEXT, MODIFYING FACTORS, ASSOCIATED SIGNS AND SYMPTOMS)
64 y/o female, , two adult children (son and daughter), domiciled in assisted living facility ([facility]), sister acting as healthcare proxy. PPHx of depression, anxiety, and prior hx of SA/SI/NSSIB, with no known hx of psychiatric hospitalizations or hx of significant withdrawals/substance use. PMHx of dementia. Psychiatry consulted for anxiety and depression.     Pt is a limited historian 2/2 to dementia. Reports having a "meltdown" which lead her to the hospital, but is unable to remember exactly what happened. States nothing in particular triggered the episode to her knowledge. Reports her sisters sent her to live at the assisted living facility against her wishes. She wishes to return home with sisters. Denies mistreatment or feeling unsafe in her current living situation. Denies active or passive SIIP/HIIP.

## 2024-08-21 NOTE — BH CONSULTATION LIAISON ASSESSMENT NOTE - CURRENT MEDICATION
MEDICATIONS  (STANDING):  busPIRone 15 milliGRAM(s) Oral two times a day  chlorhexidine 4% Liquid 1 Application(s) Topical <User Schedule>  donepezil 10 milliGRAM(s) Oral at bedtime  lidocaine   4% Patch 1 Patch Transdermal every 24 hours  LORazepam     Tablet 0.5 milliGRAM(s) Oral three times a day  memantine 10 milliGRAM(s) Oral two times a day  mirtazapine 30 milliGRAM(s) Oral at bedtime  oxybutynin 5 milliGRAM(s) Oral daily  pantoprazole    Tablet 40 milliGRAM(s) Oral before breakfast  senna 2 Tablet(s) Oral at bedtime    MEDICATIONS  (PRN):  acetaminophen     Tablet .. 650 milliGRAM(s) Oral every 6 hours PRN Temp greater or equal to 38C (100.4F), Mild Pain (1 - 3)  acetaminophen   IVPB .. 1000 milliGRAM(s) IV Intermittent every 6 hours PRN Temp greater or equal to 38C (100.4F), Mild Pain (1 - 3)  morphine  - Injectable 2 milliGRAM(s) IV Push every 4 hours PRN Severe Pain (7 - 10)  ondansetron Injectable 4 milliGRAM(s) IV Push every 6 hours PRN Nausea  oxyCODONE    IR 5 milliGRAM(s) Oral every 4 hours PRN Moderate Pain (4 - 6)  oxyCODONE    IR 10 milliGRAM(s) Oral every 4 hours PRN Severe Pain (7 - 10)  polyethylene glycol 3350 17 Gram(s) Oral daily PRN Constipation

## 2024-08-21 NOTE — BH CONSULTATION LIAISON ASSESSMENT NOTE - RISK ASSESSMENT
Risk factors: passive SI statements, h/o depression, remote SA     Protective factors: no current SIIP/HIIP, no h/o psych admissions, no access to weapons, no active substance abuse, domiciled, social supports, positive therapeutic relationship, engaged in treatment, compliant with treatment, help-seeking behaviors, future-orientation    Overall, pt is at chronically elevated risk of harm mitigated by multiple protective factors and does not meet criteria for psychiatric admission at this time.

## 2024-08-21 NOTE — PROGRESS NOTE ADULT - ASSESSMENT
62y/o F with PMHx of early dementia admitted from long-term with T9 vertebral compression fx , Lt acetabular fx,  Lt femoral head displacement, and Left superior pubic ramus fx with pelvic hematoma. Non witnessed fall, pt woke up with left hip pain as per JAMIE.  In ED, Pt then had witnessed tonic clonic seizure at approximately 2:45pm, was given Ativan 2mg IV.    #Unwitnessed Traumatic Fall  #T7-T9 vertebral compression fx  #Lt acetabular fx  #Lt femoral head displacement  #Lt superior pubic ramus fx  #Pelvic hematoma   #TC seizure     Plan:  Admit under trauma service.   Orthopedic / Ortho-spine surgery consult appreciated  -Non-operative treatment 2/2 patient mental status and hx. Activity: WBAT  -TLSO for comfort   -Cont analgesic and antiemetic therapy  -IS and deep breathing exercises  -Stable serial CBC, cont daily   -Failed TOV, Maintain gray at this time. Urology consult for outpt followup   -Monitor I/O, UO. Replace lytes PRN  -Zosyn for empiric coverage in setting of acute pelvic hematoma (stt 8/17)    Tonic-Clonic Sezuire  Neuro consult appreciated  EEG: Moderate to severe diffuse/multi-focal cerebral dysfunction, not specific as to etiology  -Seizure precautions, cushioned bed rails, and PRN ativan for seizure >1 minute  -Pt unable to tolerate MRI head --> CTH without acute etiology.   -Suspected Benzo w/drawl (Resumed Ativan PO TID)     Cont Diet  GI/DVT ppx (SCD only in setting of acute pelvic hematoma).   Cont appropriate home medication mgmt   PT: TONA ALCALA for eventual D/C planning  Psych eval for anxiety / depression (cont current medication mgmt)      Case and plan discussed with surgical attending.

## 2024-08-21 NOTE — BH CONSULTATION LIAISON ASSESSMENT NOTE - NSICDXBHSECONDARYDX_PSY_ALL_CORE
Closed pelvic fracture   S32.9XXA  Traumatic hematoma   T14.8XXA  Thoracic compression fracture   S22.000A  Shoulder pain, left   M25.512  Anxiety with depression   F41.8

## 2024-08-21 NOTE — BH CONSULTATION LIAISON ASSESSMENT NOTE - SUMMARY
62 y/o female, , two adult children (son and daughter), domiciled in assisted living facility ([facility]), sister acting as healthcare proxy. PPHx of depression, anxiety, and prior hx of SA/SI/NSSIB, with no known hx of psychiatric hospitalizations or hx of significant withdrawals/substance use. PMHx of dementia. Psychiatry consulted for anxiety and depression.     Pt is a limited historian 2/2 to dementia. Reports having a "meltdown" which lead her to the hospital, but is unable to remember exactly what happened. States nothing in particular triggered the episode to her knowledge. Reports her sisters sent her to live at the assisted living facility against her wishes. She wishes to return home with sisters. Denies mistreatment or feeling unsafe in her current living situation. Denies active or passive SIIP/HIIP.    Would recommend continuing current medication regimen of Lorazepam, Mirtazapine and Buspar. I would not discontinue nor taper Lorazepam as patient has been on this medication for long time and when medication was discontinued in hospital she had withdrawal seizure. Further med management can be deferred to outpatient provider. 64 y/o female, , two adult children (son and daughter), domiciled in assisted living facility ([facility]), sister acting as healthcare proxy. PPHx of depression, anxiety, and prior hx of SA/SI/NSSIB, with no known hx of psychiatric hospitalizations or hx of significant withdrawals/substance use. PMHx of dementia. Psychiatry consulted for anxiety and depression.     Pt is a limited historian 2/2 to dementia. Reports having a "meltdown" which lead her to the hospital, but is unable to remember exactly what happened. States nothing in particular triggered the episode to her knowledge. Reports her sisters sent her to live at the assisted living facility against her wishes. She wishes to return home with sisters. Denies mistreatment or feeling unsafe in her current living situation. Denies active or passive SIIP/HIIP.    Would recommend continuing current medication regimen of Lorazepam, Mirtazapine and Buspar. Will start lose dose SSRI Escitalopram 5 mg for depression/anxiety. I would not discontinue nor taper Lorazepam as patient has been on this medication for long time and when medication was discontinued in hospital she had withdrawal seizure. Further med management can be deferred to outpatient provider.

## 2024-08-21 NOTE — CONSULT NOTE ADULT - CONSULT REASON
T7-9 mild VCF   Called 2pm   Seen 715pm
Seizure
Consulted at ~1930  Patient seen and examined at ~1945
Medical management
Pal managaement
Medical management
Medical comanagment trauma patient

## 2024-08-21 NOTE — CONSULT NOTE ADULT - CONSULT REQUESTED DATE/TIME
16-Aug-2024 23:56
17-Aug-2024 19:59
20-Aug-2024 08:27
16-Aug-2024 19:30
17-Aug-2024 09:48
21-Aug-2024
21-Aug-2024 12:46

## 2024-08-22 LAB
ANION GAP SERPL CALC-SCNC: 5 MMOL/L — SIGNIFICANT CHANGE UP (ref 5–17)
BUN SERPL-MCNC: 16 MG/DL — SIGNIFICANT CHANGE UP (ref 7–23)
CALCIUM SERPL-MCNC: 8.9 MG/DL — SIGNIFICANT CHANGE UP (ref 8.5–10.1)
CHLORIDE SERPL-SCNC: 105 MMOL/L — SIGNIFICANT CHANGE UP (ref 96–108)
CO2 SERPL-SCNC: 28 MMOL/L — SIGNIFICANT CHANGE UP (ref 22–31)
CREAT SERPL-MCNC: 0.84 MG/DL — SIGNIFICANT CHANGE UP (ref 0.5–1.3)
EGFR: 79 ML/MIN/1.73M2 — SIGNIFICANT CHANGE UP
GLUCOSE SERPL-MCNC: 113 MG/DL — HIGH (ref 70–99)
HCT VFR BLD CALC: 32.3 % — LOW (ref 34.5–45)
HGB BLD-MCNC: 10.7 G/DL — LOW (ref 11.5–15.5)
MAGNESIUM SERPL-MCNC: 2.1 MG/DL — SIGNIFICANT CHANGE UP (ref 1.6–2.6)
MCHC RBC-ENTMCNC: 32.4 PG — SIGNIFICANT CHANGE UP (ref 27–34)
MCHC RBC-ENTMCNC: 33.1 GM/DL — SIGNIFICANT CHANGE UP (ref 32–36)
MCV RBC AUTO: 97.9 FL — SIGNIFICANT CHANGE UP (ref 80–100)
PHOSPHATE SERPL-MCNC: 3.5 MG/DL — SIGNIFICANT CHANGE UP (ref 2.5–4.5)
PLATELET # BLD AUTO: 267 K/UL — SIGNIFICANT CHANGE UP (ref 150–400)
POTASSIUM SERPL-MCNC: 4.2 MMOL/L — SIGNIFICANT CHANGE UP (ref 3.5–5.3)
POTASSIUM SERPL-SCNC: 4.2 MMOL/L — SIGNIFICANT CHANGE UP (ref 3.5–5.3)
RBC # BLD: 3.3 M/UL — LOW (ref 3.8–5.2)
RBC # FLD: 13.2 % — SIGNIFICANT CHANGE UP (ref 10.3–14.5)
SODIUM SERPL-SCNC: 138 MMOL/L — SIGNIFICANT CHANGE UP (ref 135–145)
WBC # BLD: 6.57 K/UL — SIGNIFICANT CHANGE UP (ref 3.8–10.5)
WBC # FLD AUTO: 6.57 K/UL — SIGNIFICANT CHANGE UP (ref 3.8–10.5)

## 2024-08-22 PROCEDURE — 99232 SBSQ HOSP IP/OBS MODERATE 35: CPT

## 2024-08-22 PROCEDURE — 99233 SBSQ HOSP IP/OBS HIGH 50: CPT

## 2024-08-22 RX ADMIN — BUSPIRONE HYDROCHLORIDE 15 MILLIGRAM(S): 30 TABLET ORAL at 21:48

## 2024-08-22 RX ADMIN — OXYCODONE HYDROCHLORIDE 10 MILLIGRAM(S): 5 TABLET ORAL at 21:47

## 2024-08-22 RX ADMIN — OXYCODONE HYDROCHLORIDE 5 MILLIGRAM(S): 5 TABLET ORAL at 02:40

## 2024-08-22 RX ADMIN — OXYBUTYNIN CHLORIDE 5 MILLIGRAM(S): 5 TABLET ORAL at 10:04

## 2024-08-22 RX ADMIN — CHLORHEXIDINE GLUCONATE 1 APPLICATION(S): 40 SOLUTION TOPICAL at 06:30

## 2024-08-22 RX ADMIN — Medication 40 MILLIGRAM(S): at 10:04

## 2024-08-22 RX ADMIN — ESCITALOPRAM OXALATE 5 MILLIGRAM(S): 10 TABLET ORAL at 10:03

## 2024-08-22 RX ADMIN — LORAZEPAM 0.5 MILLIGRAM(S): 4 INJECTION INTRAMUSCULAR; INTRAVENOUS at 06:35

## 2024-08-22 RX ADMIN — LORAZEPAM 0.5 MILLIGRAM(S): 4 INJECTION INTRAMUSCULAR; INTRAVENOUS at 15:45

## 2024-08-22 RX ADMIN — Medication 2 MILLIGRAM(S): at 14:10

## 2024-08-22 RX ADMIN — MEMANTINE 10 MILLIGRAM(S): 7 CAPSULE, EXTENDED RELEASE ORAL at 10:04

## 2024-08-22 RX ADMIN — BUSPIRONE HYDROCHLORIDE 15 MILLIGRAM(S): 30 TABLET ORAL at 10:03

## 2024-08-22 RX ADMIN — Medication 30 MILLIGRAM(S): at 21:47

## 2024-08-22 RX ADMIN — OXYCODONE HYDROCHLORIDE 10 MILLIGRAM(S): 5 TABLET ORAL at 22:17

## 2024-08-22 RX ADMIN — LORAZEPAM 0.5 MILLIGRAM(S): 4 INJECTION INTRAMUSCULAR; INTRAVENOUS at 20:20

## 2024-08-22 RX ADMIN — Medication 2 MILLIGRAM(S): at 14:40

## 2024-08-22 RX ADMIN — DONEPEZIL HYDROCHLORIDE 10 MILLIGRAM(S): 5 TABLET, FILM COATED ORAL at 21:47

## 2024-08-22 RX ADMIN — MEMANTINE 10 MILLIGRAM(S): 7 CAPSULE, EXTENDED RELEASE ORAL at 21:49

## 2024-08-22 NOTE — PROGRESS NOTE ADULT - ASSESSMENT
64y/o F with PMHx of early dementia admitted from assisted with T9 vertebral compression fx , Lt acetabular fx,  Lt femoral head displacement, and Left superior pubic ramus fx with pelvic hematoma. Non witnessed fall, pt woke up with left hip pain as per JAMIE.  In ED, Pt then had witnessed tonic clonic seizure at approximately 2:45pm, was given Ativan 2mg IV.    #Unwitnessed Traumatic Fall  #T7-T9 vertebral compression fx  #Lt acetabular fx  #Lt femoral head displacement  #Lt superior pubic ramus fx  #Pelvic hematoma   #TC seizure     Plan:  Admit under trauma service.   Orthopedic / Ortho-spine surgery consult appreciated  -Non-operative treatment 2/2 patient mental status and hx. Activity: WBAT  -TLSO for comfort   -Cont analgesic and antiemetic therapy  -IS and deep breathing exercises  -Stable serial CBC, cont daily   -Failed TOV, Maintain gray at this time. Urology consult appreciated   -F/U with Dr. Medeiros or Dr. Ramirez [phone] as outpatient for gray removal  -Monitor I/O, UO. Replace lytes PRN  -Zosyn for empiric coverage in setting of acute pelvic hematoma (stt 8/17)    Tonic-Clonic Sezuire  Neuro consult appreciated  EEG: Moderate to severe diffuse/multi-focal cerebral dysfunction, not specific as to etiology  -Seizure precautions, cushioned bed rails, and PRN ativan for seizure >1 minute  -Pt unable to tolerate MRI head --> CTH without acute etiology.   -Suspected Benzo w/drawl (Resumed Ativan PO TID)     Cont Diet  GI/DVT ppx (SCD only in setting of acute pelvic hematoma).   Cont appropriate home medication mgmt   PT: TONA ALCALA for eventual D/C planning      Case and plan discussed with surgical attending.

## 2024-08-22 NOTE — PROGRESS NOTE ADULT - ASSESSMENT
65 y/o F presented with unwitnessed traumatic fall     1. Unwitnessed traumatic fall with subsequent T7-T9 vertebral compression fractures, left acetabular fracture, left femoral head displacement, left superior pubic ramus fracture, pelvic hematoma   - Hb stable   - Non-operative management d/t mental status   - WBAT, TSLO for comfort   - Pain control: oxycodone, morphine   - Pt failed TOV on 8/19/2024 -> Pal placed 3 days ago hx of overactive bladder   - Zosyn for empiric coverage in the setting of acute pelvic hematoma   - PT evaluation: TONA  - Orthopedics recs  aprpeciated: medical management, TLSO, increase activity with PT/walker   - Urology consult pending for pelvic hematoma     2. Tonic-clonic seizure suspected benzodiazapine withdrawal   - Today, episodes of myoclonic twitches face, arm and  trunk  - Neuro consulted-->Suspect metabolic myoclonus, in setting of opiate use, monitor for infection. no indication for AED's at this time as may be provoked in setting of benzo withdrawal.   - EEG: Moderate to severe diffuse/multi-focal cerebral dysfunction, not specific as to etiology.There were no epileptiform abnormalities recorded.    - PRN Ativan for seizure > 1 minute   - Pt was unable to tolerate MRI -> repeat CT head completed:  Unchanged minimal periventricular white matter ischemia. Mild atrophy most prominent in the BILATERAL temporal lobes.  - Ativan PO TID home dose     3. History of HTN, early dementia, overactive bladder  - c/w home medications      DVT ppx:   SCDs (d/t pelvic hematoma)     Dispo: Back to  [facility]

## 2024-08-22 NOTE — PROGRESS NOTE ADULT - ASSESSMENT
63 year old woman with HTN, dementia, presenting with lower back pain, found to have thoracic compression fractures, acetabular fracture, and pubic ramis fracture, who was witnessed to have a generalized tonic clonic seizure in the ED.  Now reported to have twitching movements of the face, arms, legs and trunk.      On exam alert than previous, disoriented, limited language, L/R confusion, no cranial nerve or appendicular motor findings.  No acute findings on imaging.  EEG showing multifocal slowing.  No epileptiform activity.      Possibility of seizure in setting of benzodiazepine withdrawal.  However, she is at general risk for developing epilepsy due to the underlying progressive neurodegenerative condition, and now developing myoclonus.      Suspect metabolic myoclonus, in setting of opiate use, and will need to monitor for infection.      -would track creatinine level, check NH3, and trend WBC and temperature, please check UA  -family instructed to take video of the myoclonus if witnessed again. Please notify neurology to review any videos if family able to capture it on video.    -no indication for AED's at this time as may be provoked in setting of benzo withdrawal.   -neurology to follow peripherally.  Please page or call with any acute neuro changes, or other issues we can help address.

## 2024-08-22 NOTE — PROGRESS NOTE ADULT - SUBJECTIVE AND OBJECTIVE BOX
Patient seen and examined at bedside.     By report, she developed twitching motions of the face, arms and trunk this PM.    Patient's sister is at the bedside, giving a description.  Of note, no changes to mental status with the twitching.  Movements are more shock like than rhythmic.      On exam:   GEN; NAD  CV: RRR, S1, S2  PULM: CTAB  NEURO:   Awake, alert, attending examiner, followed commands, but had L/R confusion, tearful throughout the encounter.    Pupils 3-2mm, symmetric, full VF's, no papilledema, conjugate gaze, no nystagmus, no facial weakness, no dysarthria, tongue midline.   MOTOR: normal bulk and tone, no drift, 4/5 symmetrically , biceps, deltoids, triceps, lower extremities no drift.  Possibly had one myoclonic jerk involving both arms and both legs over course of 10 minutes of passive observation.    COORDINATION: no ataxia  SENSORY: intact to light touch, L/R confusion.     CTH images reviewed: motion degraded, no hemorrhage, or large territorial infarct.

## 2024-08-22 NOTE — PROGRESS NOTE ADULT - SUBJECTIVE AND OBJECTIVE BOX
CC:Patient is a 63y old  Female who presents with a chief complaint of inability to ambulate (18 Aug 2024 07:04)    Overnight: No acute events     Subjective:  Pt seen and examined at bedside. Pt is AAOx1 (self), in no acute distress. Pt currently denies N/V/D HA, dizziness, blurry vision, numbness/tingling, SOB, cough, chest pain, palpitations, abd pain or urinary sx's.     ROS:  otherwise as abovementioned ROS    Vital Signs Last 24 Hrs  T(C): 37.2 (22 Aug 2024 08:20), Max: 38.4 (21 Aug 2024 21:32)  T(F): 98.9 (22 Aug 2024 08:20), Max: 101.1 (21 Aug 2024 21:32)  HR: 101 (22 Aug 2024 08:31) (101 - 110)  BP: 105/65 (22 Aug 2024 08:31) (105/65 - 125/84)  BP(mean): 79 (22 Aug 2024 08:31) (69 - 81)  RR: 16 (22 Aug 2024 08:31) (16 - 20)  SpO2: 95% (22 Aug 2024 08:31) (95% - 97%)    Parameters below as of 22 Aug 2024 08:31  Patient On (Oxygen Delivery Method): room air        Labs:                                10.7   6.57  )-----------( 267      ( 22 Aug 2024 06:31 )             32.3     CBC Full  -  ( 22 Aug 2024 06:31 )  WBC Count : 6.57 K/uL  RBC Count : 3.30 M/uL  Hemoglobin : 10.7 g/dL  Hematocrit : 32.3 %  Platelet Count - Automated : 267 K/uL  Mean Cell Volume : 97.9 fl  Mean Cell Hemoglobin : 32.4 pg  Mean Cell Hemoglobin Concentration : 33.1 gm/dL  Auto Neutrophil # : x  Auto Lymphocyte # : x  Auto Monocyte # : x  Auto Eosinophil # : x  Auto Basophil # : x  Auto Neutrophil % : x  Auto Lymphocyte % : x  Auto Monocyte % : x  Auto Eosinophil % : x  Auto Basophil % : x    08-22    138  |  105  |  16  ----------------------------<  113<H>  4.2   |  28  |  0.84    Ca    8.9      22 Aug 2024 06:31  Phos  3.5     08-22  Mg     2.1     08-22              Meds:  acetaminophen     Tablet .. 650 milliGRAM(s) Oral every 6 hours PRN  acetaminophen   IVPB .. 1000 milliGRAM(s) IV Intermittent every 6 hours PRN  busPIRone 15 milliGRAM(s) Oral two times a day  chlorhexidine 4% Liquid 1 Application(s) Topical <User Schedule>  donepezil 10 milliGRAM(s) Oral at bedtime  escitalopram 5 milliGRAM(s) Oral daily  lidocaine   4% Patch 1 Patch Transdermal every 24 hours  LORazepam     Tablet 0.5 milliGRAM(s) Oral three times a day  memantine 10 milliGRAM(s) Oral two times a day  mirtazapine 30 milliGRAM(s) Oral at bedtime  morphine  - Injectable 2 milliGRAM(s) IV Push every 4 hours PRN  ondansetron Injectable 4 milliGRAM(s) IV Push every 6 hours PRN  oxybutynin 5 milliGRAM(s) Oral daily  oxyCODONE    IR 5 milliGRAM(s) Oral every 4 hours PRN  oxyCODONE    IR 10 milliGRAM(s) Oral every 4 hours PRN  pantoprazole    Tablet 40 milliGRAM(s) Oral before breakfast  polyethylene glycol 3350 17 Gram(s) Oral daily PRN  senna 2 Tablet(s) Oral at bedtime      Radiology:      Physical exam:  GCS of 15  Pt is aaox3  Pt in no acute distress  Airway is patent  Breathing is symmetric and unlabored  Neuro: CN II-XII grossly intact  Psych: normal affect  HEENT: normocephalic, GENEVIEVE, EOM wnl, no gross craniofacial bony pathology to exam  Neck: No tracheal deviation, no JVD, no crepitus, no ecchymosis, no hematoma  Chest: No gross rib or sternal pathology or tenderness to exam, no crepitus, no ecchymosis, no hematoma  Resp: CTAB  CVS: S1S2(+)  ABD: bowel sounds (+), soft, nontender, non distended, no rebound, no guarding, no rigidity, no pelvic instability to exam  EXT: no calf tenderness or edema to exam b/l, pt has good capillary refill in all digits. Sensoromotor function grossly intact, on VTE prophylaxis  Skin: no adverse skin changes to exam  +Pal

## 2024-08-22 NOTE — PROGRESS NOTE ADULT - SUBJECTIVE AND OBJECTIVE BOX
Patient is a 63y old  Female who presents with a chief complaint of inability to ambulate (18 Aug 2024 07:04)    INTERVAL HPI/OVERNIGHT EVENTS: no events. today with episodes of myoclonic twitches face arm and trunk    MEDICATIONS  (STANDING):  busPIRone 15 milliGRAM(s) Oral two times a day  chlorhexidine 4% Liquid 1 Application(s) Topical <User Schedule>  donepezil 10 milliGRAM(s) Oral at bedtime  escitalopram 5 milliGRAM(s) Oral daily  lidocaine   4% Patch 1 Patch Transdermal every 24 hours  LORazepam     Tablet 0.5 milliGRAM(s) Oral three times a day  memantine 10 milliGRAM(s) Oral two times a day  mirtazapine 30 milliGRAM(s) Oral at bedtime  oxybutynin 5 milliGRAM(s) Oral daily  pantoprazole    Tablet 40 milliGRAM(s) Oral before breakfast  senna 2 Tablet(s) Oral at bedtime    MEDICATIONS  (PRN):  acetaminophen     Tablet .. 650 milliGRAM(s) Oral every 6 hours PRN Temp greater or equal to 38C (100.4F), Mild Pain (1 - 3)  acetaminophen   IVPB .. 1000 milliGRAM(s) IV Intermittent every 6 hours PRN Temp greater or equal to 38C (100.4F), Mild Pain (1 - 3)  morphine  - Injectable 2 milliGRAM(s) IV Push every 4 hours PRN Severe Pain (7 - 10)  ondansetron Injectable 4 milliGRAM(s) IV Push every 6 hours PRN Nausea  oxyCODONE    IR 5 milliGRAM(s) Oral every 4 hours PRN Moderate Pain (4 - 6)  oxyCODONE    IR 10 milliGRAM(s) Oral every 4 hours PRN Severe Pain (7 - 10)  polyethylene glycol 3350 17 Gram(s) Oral daily PRN Constipation      Allergies    No Known Allergies    Intolerances        REVIEW OF SYSTEMS:  CONSTITUTIONAL: No fever or chills  HEENT:  No headache, no sore throat  RESPIRATORY: No cough, wheezing, or shortness of breath  CARDIOVASCULAR: No chest pain, palpitations  GASTROINTESTINAL: No abd pain, nausea, vomiting, or diarrhea  GENITOURINARY: No dysuria, frequency, or hematuria  NEUROLOGICAL: no focal weakness or dizziness  MUSCULOSKELETAL: +myoclonic  twitches      Vital Signs Last 24 Hrs  T(C): 37.5 (22 Aug 2024 17:17), Max: 38.4 (21 Aug 2024 21:32)  T(F): 99.5 (22 Aug 2024 17:17), Max: 101.1 (21 Aug 2024 21:32)  HR: 110 (22 Aug 2024 17:17) (101 - 112)  BP: 112/81 (22 Aug 2024 17:17) (104/60 - 125/84)  BP(mean): 71 (22 Aug 2024 14:34) (69 - 79)  RR: 18 (22 Aug 2024 17:17) (14 - 20)  SpO2: 96% (22 Aug 2024 17:17) (95% - 97%)    Parameters below as of 22 Aug 2024 17:17  Patient On (Oxygen Delivery Method): room air        PHYSICAL EXAM:  GENERAL: NAD  HEENT:  anicteric, moist mucous membranes  CHEST/LUNG:  CTA b/l, no rales, wheezes, or rhonchi  HEART:  RRR, S1, S2  ABDOMEN:  BS+, soft, nontender, nondistended  EXTREMITIES: no edema, cyanosis, or calf tenderness  NERVOUS SYSTEM: anxious,  emotional at times    LABS:                        10.7   6.57  )-----------( 267      ( 22 Aug 2024 06:31 )             32.3     CBC Full  -  ( 22 Aug 2024 06:31 )  WBC Count : 6.57 K/uL  Hemoglobin : 10.7 g/dL  Hematocrit : 32.3 %  Platelet Count - Automated : 267 K/uL  Mean Cell Volume : 97.9 fl  Mean Cell Hemoglobin : 32.4 pg  Mean Cell Hemoglobin Concentration : 33.1 gm/dL  Auto Neutrophil # : x  Auto Lymphocyte # : x  Auto Monocyte # : x  Auto Eosinophil # : x  Auto Basophil # : x  Auto Neutrophil % : x  Auto Lymphocyte % : x  Auto Monocyte % : x  Auto Eosinophil % : x  Auto Basophil % : x    22 Aug 2024 06:31    138    |  105    |  16     ----------------------------<  113    4.2     |  28     |  0.84     Ca    8.9        22 Aug 2024 06:31  Phos  3.5       22 Aug 2024 06:31  Mg     2.1       22 Aug 2024 06:31        Urinalysis Basic - ( 22 Aug 2024 06:31 )    Color: x / Appearance: x / SG: x / pH: x  Gluc: 113 mg/dL / Ketone: x  / Bili: x / Urobili: x   Blood: x / Protein: x / Nitrite: x   Leuk Esterase: x / RBC: x / WBC x   Sq Epi: x / Non Sq Epi: x / Bacteria: x      CAPILLARY BLOOD GLUCOSE        Culture - Urine (collected 08-16-24 @ 23:57)  Source: Catheterized Catheterized  Final Report (08-18-24 @ 10:53):    No growth    Urinalysis with Rflx Culture (collected 08-16-24 @ 15:59)    Culture - Urine (collected 08-16-24 @ 15:59)  Source: .Urine None  Final Report (08-18-24 @ 00:19):    <10,000 CFU/mL Normal Urogenital Theresa        RADIOLOGY & ADDITIONAL TESTS:    Personally reviewed.     Consultant(s) Notes Reviewed:  [x] YES  [ ] NO

## 2024-08-23 ENCOUNTER — TRANSCRIPTION ENCOUNTER (OUTPATIENT)
Age: 64
End: 2024-08-23

## 2024-08-23 VITALS
RESPIRATION RATE: 18 BRPM | TEMPERATURE: 98 F | DIASTOLIC BLOOD PRESSURE: 72 MMHG | OXYGEN SATURATION: 99 % | HEART RATE: 94 BPM | SYSTOLIC BLOOD PRESSURE: 122 MMHG

## 2024-08-23 LAB
AMMONIA BLD-MCNC: 11 UMOL/L — SIGNIFICANT CHANGE UP (ref 11–32)
ANION GAP SERPL CALC-SCNC: 4 MMOL/L — LOW (ref 5–17)
BUN SERPL-MCNC: 18 MG/DL — SIGNIFICANT CHANGE UP (ref 7–23)
CALCIUM SERPL-MCNC: 8.9 MG/DL — SIGNIFICANT CHANGE UP (ref 8.5–10.1)
CHLORIDE SERPL-SCNC: 105 MMOL/L — SIGNIFICANT CHANGE UP (ref 96–108)
CO2 SERPL-SCNC: 29 MMOL/L — SIGNIFICANT CHANGE UP (ref 22–31)
CREAT SERPL-MCNC: 0.74 MG/DL — SIGNIFICANT CHANGE UP (ref 0.5–1.3)
EGFR: 91 ML/MIN/1.73M2 — SIGNIFICANT CHANGE UP
GLUCOSE SERPL-MCNC: 113 MG/DL — HIGH (ref 70–99)
HCT VFR BLD CALC: 31.1 % — LOW (ref 34.5–45)
HGB BLD-MCNC: 10 G/DL — LOW (ref 11.5–15.5)
MCHC RBC-ENTMCNC: 31.6 PG — SIGNIFICANT CHANGE UP (ref 27–34)
MCHC RBC-ENTMCNC: 32.2 GM/DL — SIGNIFICANT CHANGE UP (ref 32–36)
MCV RBC AUTO: 98.4 FL — SIGNIFICANT CHANGE UP (ref 80–100)
PLATELET # BLD AUTO: 307 K/UL — SIGNIFICANT CHANGE UP (ref 150–400)
POTASSIUM SERPL-MCNC: 3.7 MMOL/L — SIGNIFICANT CHANGE UP (ref 3.5–5.3)
POTASSIUM SERPL-SCNC: 3.7 MMOL/L — SIGNIFICANT CHANGE UP (ref 3.5–5.3)
RBC # BLD: 3.16 M/UL — LOW (ref 3.8–5.2)
RBC # FLD: 13 % — SIGNIFICANT CHANGE UP (ref 10.3–14.5)
SODIUM SERPL-SCNC: 138 MMOL/L — SIGNIFICANT CHANGE UP (ref 135–145)
WBC # BLD: 5.71 K/UL — SIGNIFICANT CHANGE UP (ref 3.8–10.5)
WBC # FLD AUTO: 5.71 K/UL — SIGNIFICANT CHANGE UP (ref 3.8–10.5)

## 2024-08-23 PROCEDURE — 99238 HOSP IP/OBS DSCHRG MGMT 30/<: CPT

## 2024-08-23 PROCEDURE — 99232 SBSQ HOSP IP/OBS MODERATE 35: CPT

## 2024-08-23 RX ORDER — ACETAMINOPHEN 325 MG/1
2 TABLET ORAL
Qty: 0 | Refills: 0 | DISCHARGE
Start: 2024-08-23

## 2024-08-23 RX ORDER — ESCITALOPRAM OXALATE 10 MG/1
1 TABLET ORAL
Qty: 0 | Refills: 0 | DISCHARGE
Start: 2024-08-23

## 2024-08-23 RX ORDER — PANTOPRAZOLE SODIUM 40 MG
1 TABLET, DELAYED RELEASE (ENTERIC COATED) ORAL
Qty: 0 | Refills: 0 | DISCHARGE
Start: 2024-08-23

## 2024-08-23 RX ORDER — LIDOCAINE/BENZALKONIUM/ALCOHOL
1 SOLUTION, NON-ORAL TOPICAL
Qty: 0 | Refills: 0 | DISCHARGE
Start: 2024-08-23

## 2024-08-23 RX ORDER — SENNA 187 MG
2 TABLET ORAL
Qty: 0 | Refills: 0 | DISCHARGE
Start: 2024-08-23

## 2024-08-23 RX ORDER — ACETAMINOPHEN 325 MG/1
100 TABLET ORAL
Qty: 0 | Refills: 0 | DISCHARGE
Start: 2024-08-23

## 2024-08-23 RX ORDER — ACETAMINOPHEN 325 MG/1
1000 TABLET ORAL EVERY 6 HOURS
Refills: 0 | Status: COMPLETED | OUTPATIENT
Start: 2024-08-23 | End: 2024-08-23

## 2024-08-23 RX ORDER — OXYCODONE HYDROCHLORIDE 5 MG/1
1 TABLET ORAL
Qty: 0 | Refills: 0 | DISCHARGE
Start: 2024-08-23

## 2024-08-23 RX ORDER — POLYETHYLENE GLYCOL 3350 17 G/17G
17 POWDER, FOR SOLUTION ORAL
Qty: 0 | Refills: 0 | DISCHARGE
Start: 2024-08-23

## 2024-08-23 RX ADMIN — MEMANTINE 10 MILLIGRAM(S): 7 CAPSULE, EXTENDED RELEASE ORAL at 09:38

## 2024-08-23 RX ADMIN — OXYCODONE HYDROCHLORIDE 10 MILLIGRAM(S): 5 TABLET ORAL at 12:20

## 2024-08-23 RX ADMIN — OXYBUTYNIN CHLORIDE 5 MILLIGRAM(S): 5 TABLET ORAL at 09:40

## 2024-08-23 RX ADMIN — ACETAMINOPHEN 1000 MILLIGRAM(S): 325 TABLET ORAL at 00:43

## 2024-08-23 RX ADMIN — LORAZEPAM 0.5 MILLIGRAM(S): 4 INJECTION INTRAMUSCULAR; INTRAVENOUS at 06:00

## 2024-08-23 RX ADMIN — OXYCODONE HYDROCHLORIDE 10 MILLIGRAM(S): 5 TABLET ORAL at 07:00

## 2024-08-23 RX ADMIN — OXYCODONE HYDROCHLORIDE 10 MILLIGRAM(S): 5 TABLET ORAL at 06:30

## 2024-08-23 RX ADMIN — ESCITALOPRAM OXALATE 5 MILLIGRAM(S): 10 TABLET ORAL at 09:37

## 2024-08-23 RX ADMIN — BUSPIRONE HYDROCHLORIDE 15 MILLIGRAM(S): 30 TABLET ORAL at 09:38

## 2024-08-23 RX ADMIN — LORAZEPAM 0.5 MILLIGRAM(S): 4 INJECTION INTRAMUSCULAR; INTRAVENOUS at 13:43

## 2024-08-23 RX ADMIN — ACETAMINOPHEN 400 MILLIGRAM(S): 325 TABLET ORAL at 00:41

## 2024-08-23 NOTE — DISCHARGE NOTE PROVIDER - CARE PROVIDER_API CALL
Rodrigo Medeiros  Urology  [address]  [place] [address]  Phone: [phone]  Fax: [phone]  Follow Up Time: 1 month    Trevor Gonsalez (DO)  Surgery  [address] [place] [address]  Phone: [phone]  Fax: [phone]  Follow Up Time: 1 month

## 2024-08-23 NOTE — PROGRESS NOTE ADULT - PROVIDER SPECIALTY LIST ADULT
Hospitalist
Neurology
Orthopedics
Trauma Surgery
Neurology
Orthopedics
Trauma Surgery
Hospitalist
Trauma Surgery
Orthopedics

## 2024-08-23 NOTE — DISCHARGE NOTE NURSING/CASE MANAGEMENT/SOCIAL WORK - PATIENT PORTAL LINK FT
You can access the FollowMyHealth Patient Portal offered by [facility] by registering at the following website: [email or web address]. By joining [facility]’s FollowMyHealth portal, you will also be able to view your health information using other applications (apps) compatible with our system.

## 2024-08-23 NOTE — PROGRESS NOTE ADULT - ASSESSMENT
63 y/o F presented with unwitnessed traumatic fall     1. Unwitnessed traumatic fall with subsequent T7-T9 vertebral compression fractures, left acetabular fracture, left femoral head displacement, left superior pubic ramus fracture, pelvic hematoma   - Hb stable   - Non-operative management d/t mental status   - WBAT, TSLO for comfort   - Pain control: oxycodone, morphine   - Pt failed TOV on 8/19/2024 -> Gray placed 3 days ago hx of overactive bladder   - Zosyn for empiric coverage in the setting of acute pelvic hematoma   - PT evaluation: TONA  - Orthopedics recs: medical management, TLSO, increase activity with PT/walker   - Urology for pelvic hematoma, pt being afraid of voiding, rehab with gray     2. Tonic-clonic seizure suspected benzodiazapine withdrawal   -  2/22 myoclonic twitches face, arm and  trunk - no more episodes today   - Neuro consulted-->Suspect metabolic myoclonus, in setting of opiate use, monitor for infection. no indication for AED's at this time as may be provoked in setting of benzo withdrawal.   - EEG: Moderate to severe diffuse/multi-focal cerebral dysfunction, not specific as to etiology.There were no epileptiform abnormalities recorded.    - PRN Ativan for seizure > 1 minute   - Pt was unable to tolerate MRI -> repeat CT head completed:  Unchanged minimal periventricular white matter ischemia. Mild atrophy most prominent in the BILATERAL temporal lobes.  - Ativan PO TID home dose     3. History of HTN, early dementia, overactive bladder  - c/w home medications      DVT ppx:   SCDs (d/t pelvic hematoma)     Dispo: Back to  [facility], stable

## 2024-08-23 NOTE — DISCHARGE NOTE PROVIDER - HOSPITAL COURSE
64y/o F with PMHx of early dementia admitted from [facility] with T9 vertebral compression fx , Lt acetabular fx,  Lt femoral head displacement, and Left superior pubic ramus fx with pelvic hematoma. Non witnessed fall, pt woke up with left hip pain as per JAMIE.  In ED, Pt then had witnessed tonic clonic seizure at approximately 2:45pm, was given Ativan 2mg IV.    Injuries diagnosed at admission :   #Unwitnessed Traumatic Fall  #T7-T9 vertebral compression fx  #Lt acetabular fx  #Lt femoral head displacement  #Lt superior pubic ramus fx  #Pelvic hematoma   #TC seizure       Pt was admited under trauma service. Orthopedic / Ortho-spine surgery consult appreciated, -Non-operative treatment 2/2 patient mental status and hx. Activity: WBAT  -TLSO for comfort -Cont analgesic and antiemetic therapy, -IS and deep breathing exercises, -Stable serial CBC, cont daily ,   -Failed TOV, Maintain gray at this time. Urology consult appreciated  -F/U with Dr. Medeiros or Dr. Ramirez [phone] as outpatient for gray removal  -Monitor I/O, UO. Replace lytes PRN  -Zosyn for empiric coverage in setting of acute pelvic hematoma (stt 8/17)    Tonic-Clonic Se[place], Neuro consult appreciated  EEG: Moderate to severe diffuse/multi-focal cerebral dysfunction, not specific as to etiology  -Seizure precautions, cushioned bed rails, and PRN ativan for seizure >1 minute  -Pt unable to tolerate MRI head --> CTH without acute etiology.   -Suspected Benzo w/drawl (Resumed Ativan PO TID)     Patient was measured and dispensed a low profile TLSO. Given the patient's other injuries and the alzheimer's a lower profile device will hopefully allow the patient to be compliant. The orthosis will stabilize and control the spine, reduce pain and ROM, allow for safer ambulation and assist in the healing process. Care use and function were explained to the family members. Contact info was provided. All went without incident.   [facility]  [phone].      Cont Diet, GI/DVT ppx (SCD only in setting of acute pelvic hematoma). , Cont appropriate home medication mgmt , PT: TONA, CARI for eventual D/C planning      Pt deemed stable for DC to [facility]

## 2024-08-23 NOTE — PROGRESS NOTE ADULT - SUBJECTIVE AND OBJECTIVE BOX
Patient is a 63y old  Female who presents with a chief complaint of traumatic fall  (23 Aug 2024 09:47)      INTERVAL HPI/OVERNIGHT EVENTS: no more myotonic twitching episodes, mood more stable    MEDICATIONS  (STANDING):  busPIRone 15 milliGRAM(s) Oral two times a day  chlorhexidine 4% Liquid 1 Application(s) Topical <User Schedule>  donepezil 10 milliGRAM(s) Oral at bedtime  escitalopram 5 milliGRAM(s) Oral daily  lidocaine   4% Patch 1 Patch Transdermal every 24 hours  LORazepam     Tablet 0.5 milliGRAM(s) Oral three times a day  memantine 10 milliGRAM(s) Oral two times a day  mirtazapine 30 milliGRAM(s) Oral at bedtime  oxybutynin 5 milliGRAM(s) Oral daily  pantoprazole    Tablet 40 milliGRAM(s) Oral before breakfast  senna 2 Tablet(s) Oral at bedtime    MEDICATIONS  (PRN):  acetaminophen     Tablet .. 650 milliGRAM(s) Oral every 6 hours PRN Temp greater or equal to 38C (100.4F), Mild Pain (1 - 3)  acetaminophen   IVPB .. 1000 milliGRAM(s) IV Intermittent every 6 hours PRN Temp greater or equal to 38C (100.4F), Mild Pain (1 - 3)  morphine  - Injectable 2 milliGRAM(s) IV Push every 4 hours PRN Severe Pain (7 - 10)  ondansetron Injectable 4 milliGRAM(s) IV Push every 6 hours PRN Nausea  oxyCODONE    IR 5 milliGRAM(s) Oral every 4 hours PRN Moderate Pain (4 - 6)  oxyCODONE    IR 10 milliGRAM(s) Oral every 4 hours PRN Severe Pain (7 - 10)  polyethylene glycol 3350 17 Gram(s) Oral daily PRN Constipation      Allergies    No Known Allergies    Intolerances        REVIEW OF SYSTEMS:  CONSTITUTIONAL: No fever or chills  HEENT:  No headache, no sore throat  RESPIRATORY: No cough, wheezing, or shortness of breath  CARDIOVASCULAR: No chest pain, palpitations  GASTROINTESTINAL: No abd pain, nausea, vomiting, or diarrhea  GENITOURINARY: No dysuria, frequency, or hematuria  NEUROLOGICAL: no focal weakness or dizziness  MUSCULOSKELETAL: no more episodes myoclonic  twitches       Vital Signs Last 24 Hrs  T(C): 36.8 (23 Aug 2024 08:50), Max: 37.5 (22 Aug 2024 17:17)  T(F): 98.2 (23 Aug 2024 08:50), Max: 99.5 (22 Aug 2024 17:17)  HR: 94 (23 Aug 2024 08:50) (94 - 112)  BP: 122/72 (23 Aug 2024 08:50) (104/60 - 122/72)  BP(mean): 71 (22 Aug 2024 14:34) (71 - 71)  RR: 18 (23 Aug 2024 08:50) (14 - 18)  SpO2: 99% (23 Aug 2024 08:50) (91% - 99%)    Parameters below as of 23 Aug 2024 08:50  Patient On (Oxygen Delivery Method): room air        PHYSICAL EXAM:  GENERAL: NAD  HEENT:  anicteric, moist mucous membranes  CHEST/LUNG:  CTA b/l, no rales, wheezes, or rhonchi  HEART:  RRR, S1, S2  ABDOMEN:  BS+, soft, nontender, nondistended  EXTREMITIES: no edema, cyanosis, or calf tenderness  NERVOUS SYSTEM: anxious, mood more stable     LABS:                        10.0   5.71  )-----------( 307      ( 23 Aug 2024 08:34 )             31.1     CBC Full  -  ( 23 Aug 2024 08:34 )  WBC Count : 5.71 K/uL  Hemoglobin : 10.0 g/dL  Hematocrit : 31.1 %  Platelet Count - Automated : 307 K/uL  Mean Cell Volume : 98.4 fl  Mean Cell Hemoglobin : 31.6 pg  Mean Cell Hemoglobin Concentration : 32.2 gm/dL  Auto Neutrophil # : x  Auto Lymphocyte # : x  Auto Monocyte # : x  Auto Eosinophil # : x  Auto Basophil # : x  Auto Neutrophil % : x  Auto Lymphocyte % : x  Auto Monocyte % : x  Auto Eosinophil % : x  Auto Basophil % : x    23 Aug 2024 08:34    138    |  105    |  18     ----------------------------<  113    3.7     |  29     |  0.74     Ca    8.9        23 Aug 2024 08:34        Urinalysis Basic - ( 23 Aug 2024 08:34 )    Color: x / Appearance: x / SG: x / pH: x  Gluc: 113 mg/dL / Ketone: x  / Bili: x / Urobili: x   Blood: x / Protein: x / Nitrite: x   Leuk Esterase: x / RBC: x / WBC x   Sq Epi: x / Non Sq Epi: x / Bacteria: x      CAPILLARY BLOOD GLUCOSE    Culture - Urine (collected 08-16-24 @ 23:57)  Source: Catheterized Catheterized  Final Report (08-18-24 @ 10:53):    No growth    Urinalysis with Rflx Culture (collected 08-16-24 @ 15:59)    Culture - Urine (collected 08-16-24 @ 15:59)  Source: .Urine None  Final Report (08-18-24 @ 00:19):    <10,000 CFU/mL Normal Urogenital Theresa        RADIOLOGY & ADDITIONAL TESTS:    Personally reviewed.     Consultant(s) Notes Reviewed:  [x] YES  [ ] NO

## 2024-08-23 NOTE — DISCHARGE NOTE PROVIDER - CARE PROVIDERS DIRECT ADDRESSES
[email or web address]

## 2024-08-23 NOTE — DISCHARGE NOTE PROVIDER - NSDCCPCAREPLAN_GEN_ALL_CORE_FT
PRINCIPAL DISCHARGE DIAGNOSIS  Diagnosis: Left acetabular fracture  Assessment and Plan of Treatment:

## 2024-08-23 NOTE — DISCHARGE NOTE PROVIDER - PROVIDER TOKENS
PROVIDER:[TOKEN:[[id number]],FOLLOWUP:[1 month]],PROVIDER:[TOKEN:[[id number]],FOLLOWUP:[1 month]]

## 2024-08-23 NOTE — DISCHARGE NOTE PROVIDER - NSDCMRMEDTOKEN_GEN_ALL_CORE_FT
acetaminophen 10 mg/mL intravenous solution: 100 milliliter(s) intravenous every 6 hours As needed Temp greater or equal to 38C (100.4F), Mild Pain (1 - 3)  acetaminophen 325 mg oral tablet: 2 tab(s) orally every 6 hours As needed Temp greater or equal to 38C (100.4F), Mild Pain (1 - 3)  busPIRone 30 mg oral tablet: 1 tab(s) orally 2 times a day  donepezil 10 mg oral tablet: 1 tab(s) orally once a day  escitalopram 5 mg oral tablet: 1 tab(s) orally once a day  lidocaine 4% topical film: Apply topically to affected area once a day  memantine 10 mg oral tablet: 1 tab(s) orally 2 times a day  mirtazapine 30 mg oral tablet: 1 tab(s) orally once a day (at bedtime)  oxyBUTYnin 5 mg oral tablet: 1 tab(s) orally once a day  oxyCODONE 10 mg oral tablet: 1 tab(s) orally every 4 hours As needed Severe Pain (7 - 10)  oxyCODONE 5 mg oral tablet: 1 tab(s) orally every 4 hours As needed Moderate Pain (4 - 6)  pantoprazole 40 mg oral delayed release tablet: 1 tab(s) orally once a day (before a meal)  polyethylene glycol 3350 oral powder for reconstitution: 17 gram(s) orally once a day As needed Constipation  senna leaf extract oral tablet: 2 tab(s) orally once a day (at bedtime)

## 2024-08-29 DIAGNOSIS — G40.409 OTHER GENERALIZED EPILEPSY AND EPILEPTIC SYNDROMES, NOT INTRACTABLE, WITHOUT STATUS EPILEPTICUS: ICD-10-CM

## 2024-08-29 DIAGNOSIS — N32.81 OVERACTIVE BLADDER: ICD-10-CM

## 2024-08-29 DIAGNOSIS — S32.402A UNSPECIFIED FRACTURE OF LEFT ACETABULUM, INITIAL ENCOUNTER FOR CLOSED FRACTURE: ICD-10-CM

## 2024-08-29 DIAGNOSIS — G40.509 EPILEPTIC SEIZURES RELATED TO EXTERNAL CAUSES, NOT INTRACTABLE, WITHOUT STATUS EPILEPTICUS: ICD-10-CM

## 2024-08-29 DIAGNOSIS — D62 ACUTE POSTHEMORRHAGIC ANEMIA: ICD-10-CM

## 2024-08-29 DIAGNOSIS — I10 ESSENTIAL (PRIMARY) HYPERTENSION: ICD-10-CM

## 2024-08-29 DIAGNOSIS — F03.90 UNSPECIFIED DEMENTIA, UNSPECIFIED SEVERITY, WITHOUT BEHAVIORAL DISTURBANCE, PSYCHOTIC DISTURBANCE, MOOD DISTURBANCE, AND ANXIETY: ICD-10-CM

## 2024-08-29 DIAGNOSIS — N17.9 ACUTE KIDNEY FAILURE, UNSPECIFIED: ICD-10-CM

## 2024-08-29 DIAGNOSIS — F41.8 OTHER SPECIFIED ANXIETY DISORDERS: ICD-10-CM

## 2024-08-29 DIAGNOSIS — S32.512A FRACTURE OF SUPERIOR RIM OF LEFT PUBIS, INITIAL ENCOUNTER FOR CLOSED FRACTURE: ICD-10-CM

## 2024-08-29 DIAGNOSIS — F13.239 SEDATIVE, HYPNOTIC OR ANXIOLYTIC DEPENDENCE WITH WITHDRAWAL, UNSPECIFIED: ICD-10-CM

## 2024-08-29 DIAGNOSIS — S22.069A UNSPECIFIED FRACTURE OF T7-T8 VERTEBRA, INITIAL ENCOUNTER FOR CLOSED FRACTURE: ICD-10-CM
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
## 2024-08-29 DIAGNOSIS — R33.9 RETENTION OF URINE, UNSPECIFIED: ICD-10-CM

## 2024-08-29 DIAGNOSIS — M25.512 PAIN IN LEFT SHOULDER: ICD-10-CM
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
## 2024-08-29 DIAGNOSIS — S30.0XXA CONTUSION OF LOWER BACK AND PELVIS, INITIAL ENCOUNTER: ICD-10-CM

## 2025-04-09 NOTE — PATIENT PROFILE ADULT - CAREGIVER ADDRESS
Chronic Disease Management  Patient wheezing, coughing, SpO2 90% after walking from lobby which Increased to 94% with rest. Patient advised to report to the emergency department.     Time  spent with patient:  Minutes    
[address] [place], [place]

## 2025-05-08 NOTE — PATIENT PROFILE ADULT - SURGICAL SITE INCISION
Will work on exercising with [employer] or [employer] videos or walking at least 20 minutes    No juice Monday to Friday    Continue trying to decrease ammount of sweets    Monitor will monitor blood pressure at home, wakes up and 2h after schools for 1 week- please send the report- if continues to be elevated will send to nephrology     no